# Patient Record
Sex: FEMALE | Race: WHITE | NOT HISPANIC OR LATINO | Employment: FULL TIME | URBAN - METROPOLITAN AREA
[De-identification: names, ages, dates, MRNs, and addresses within clinical notes are randomized per-mention and may not be internally consistent; named-entity substitution may affect disease eponyms.]

---

## 2024-04-22 ENCOUNTER — OFFICE VISIT (OUTPATIENT)
Age: 59
End: 2024-04-22
Payer: COMMERCIAL

## 2024-04-22 VITALS
DIASTOLIC BLOOD PRESSURE: 82 MMHG | HEART RATE: 84 BPM | HEIGHT: 67 IN | OXYGEN SATURATION: 98 % | WEIGHT: 188.4 LBS | BODY MASS INDEX: 29.57 KG/M2 | SYSTOLIC BLOOD PRESSURE: 122 MMHG

## 2024-04-22 DIAGNOSIS — E55.9 VITAMIN D DEFICIENCY: ICD-10-CM

## 2024-04-22 DIAGNOSIS — E78.2 MIXED HYPERLIPIDEMIA: ICD-10-CM

## 2024-04-22 DIAGNOSIS — R73.9 HYPERGLYCEMIA: ICD-10-CM

## 2024-04-22 DIAGNOSIS — Z79.890 POSTMENOPAUSAL HRT (HORMONE REPLACEMENT THERAPY): ICD-10-CM

## 2024-04-22 DIAGNOSIS — Z79.890 HORMONE REPLACEMENT THERAPY (HRT): ICD-10-CM

## 2024-04-22 DIAGNOSIS — F41.9 ANXIETY: ICD-10-CM

## 2024-04-22 DIAGNOSIS — N80.9 ENDOMETRIOSIS: ICD-10-CM

## 2024-04-22 DIAGNOSIS — F33.1 MODERATE EPISODE OF RECURRENT MAJOR DEPRESSIVE DISORDER: ICD-10-CM

## 2024-04-22 DIAGNOSIS — I77.810 DILATION OF THORACIC AORTA: Primary | ICD-10-CM

## 2024-04-22 DIAGNOSIS — I71.21 ANEURYSM OF ASCENDING AORTA WITHOUT RUPTURE: ICD-10-CM

## 2024-04-22 DIAGNOSIS — F32.9 REACTIVE DEPRESSION: ICD-10-CM

## 2024-04-22 DIAGNOSIS — I10 ESSENTIAL HYPERTENSION: ICD-10-CM

## 2024-04-22 PROBLEM — E78.00 ELEVATED LDL CHOLESTEROL LEVEL: Status: ACTIVE | Noted: 2024-01-26

## 2024-04-22 PROBLEM — K80.50 BILIARY COLIC: Status: ACTIVE | Noted: 2018-11-23

## 2024-04-22 PROBLEM — K80.50 BILIARY COLIC: Status: RESOLVED | Noted: 2018-11-23 | Resolved: 2024-04-22

## 2024-04-22 PROBLEM — U07.1 COVID-19: Status: ACTIVE | Noted: 2021-01-23

## 2024-04-22 PROBLEM — R79.89 ELEVATED SERUM CREATININE: Status: ACTIVE | Noted: 2020-11-01

## 2024-04-22 PROBLEM — E78.5 HYPERLIPIDEMIA: Status: ACTIVE | Noted: 2024-01-26

## 2024-04-22 PROBLEM — H93.13 TINNITUS OF BOTH EARS: Status: ACTIVE | Noted: 2024-01-26

## 2024-04-22 PROBLEM — C44.92 SQUAMOUS CELL SKIN CANCER: Status: ACTIVE | Noted: 2024-01-26

## 2024-04-22 PROBLEM — E67.2 HYPERVITAMINOSIS B6: Status: ACTIVE | Noted: 2024-01-26

## 2024-04-22 PROBLEM — M85.80 OSTEOPENIA: Status: ACTIVE | Noted: 2024-01-26

## 2024-04-22 PROBLEM — G43.909 MIGRAINE: Status: ACTIVE | Noted: 2024-04-22

## 2024-04-22 PROBLEM — N39.41 URINARY INCONTINENCE, URGE: Status: ACTIVE | Noted: 2024-01-26

## 2024-04-22 PROBLEM — K59.01 SLOW TRANSIT CONSTIPATION: Status: ACTIVE | Noted: 2024-01-26

## 2024-04-22 PROCEDURE — 99204 OFFICE O/P NEW MOD 45 MIN: CPT | Performed by: INTERNAL MEDICINE

## 2024-04-22 RX ORDER — BUPROPION HYDROCHLORIDE 150 MG/1
150 TABLET ORAL EVERY MORNING
Qty: 90 TABLET | Refills: 1 | Status: SHIPPED | OUTPATIENT
Start: 2024-04-22

## 2024-04-22 RX ORDER — ESTRADIOL 0.05 MG/D
1 PATCH TRANSDERMAL 2 TIMES WEEKLY
COMMUNITY

## 2024-04-22 RX ORDER — PROGESTERONE 100 MG/1
100 CAPSULE ORAL DAILY
COMMUNITY

## 2024-04-22 RX ORDER — MELATONIN
1 DAILY
COMMUNITY

## 2024-04-22 RX ORDER — LOSARTAN POTASSIUM 50 MG/1
50 TABLET ORAL 2 TIMES DAILY
COMMUNITY

## 2024-04-22 RX ORDER — ATORVASTATIN CALCIUM 10 MG/1
10 TABLET, FILM COATED ORAL DAILY
COMMUNITY

## 2024-04-22 RX ORDER — BUPROPION HYDROCHLORIDE 150 MG/1
150 TABLET ORAL EVERY MORNING
COMMUNITY
Start: 2024-01-26 | End: 2024-04-22 | Stop reason: SDUPTHER

## 2024-04-22 RX ORDER — LIRAGLUTIDE 6 MG/ML
INJECTION, SOLUTION SUBCUTANEOUS
COMMUNITY

## 2024-04-22 RX ORDER — VENLAFAXINE HYDROCHLORIDE 75 MG/1
75 CAPSULE, EXTENDED RELEASE ORAL DAILY
COMMUNITY
End: 2024-04-22 | Stop reason: SDUPTHER

## 2024-04-22 RX ORDER — VENLAFAXINE HYDROCHLORIDE 75 MG/1
75 CAPSULE, EXTENDED RELEASE ORAL DAILY
Qty: 90 CAPSULE | Refills: 1 | Status: SHIPPED | OUTPATIENT
Start: 2024-04-22

## 2024-04-22 RX ORDER — AMLODIPINE BESYLATE 5 MG/1
5 TABLET ORAL DAILY
COMMUNITY

## 2024-04-22 NOTE — PROGRESS NOTES
New Patient Note    Cirilo Lewis is a 58 y.o. female.    Chief Complaint   Patient presents with    Establish Care    Hypertension    Depression    Stress     Moved, changed job, .     Dizziness     Sometimes when standing up, gets dizzy       HPI    HTN  - Amlodipine 5mg and Losartan 50mg  - lost a lot of weight and after that had GB issue, thought it was a heart attack but GB  - had an ACE cough and then placed on Losartan  - previously on BID Losartan and then decreased to once daily    Dilated Aorta  - not sure which segment  - watching for 3yrs and not changed  - [facility] following annually  - needs referral    HPL  - placed on statin  - Atorvastatin    Depression  - Wellbutrin 150XL  -  was having some mental health trouble and then she needed some help as well  - also on Venlafaxine  - been in therapy  - now going through divorce  - her job was eliminated and then had to find a new job   - new house - sold house and bought house  - feels really emotional  - 5/7/24 - divorce date  - a little lonely  - moved in Nov  -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  - 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  - looking for gym    Vit D Def  - continues on vitamin d    Weight Loss  - up to 200lb  - used Liraglutide for weight loss  - down to 168 and now 188    HRT  - on progesterone and estradiol  - no children  - heavy periods  - endometriosis wrapped around one ovary and getting ready to burst  - followed with GYN  - endometriosis left on bladder, tiny spots  - completed ablation and tubal ligation  - started having severe pain  - then underwent menopause after second surgery for endometriosis  - late 40s  - Wendy Jones MD at [facility]    Past Medical History:  Patient Active Problem List   Diagnosis    Aneurysm of ascending aorta without rupture    Anxiety    Aortic ectasia, thoracic    Biliary colic    Carpal tunnel syndrome    Colon polyps    COVID-19    Elevated serum creatinine     Endometriosis    Elevated LDL cholesterol level    Essential hypertension    Hyperlipidemia    Hypervitaminosis B6    Migraine    Neuroma, Pitts's    Osteopenia    Postmenopausal HRT (hormone replacement therapy)    Reactive depression    Slow transit constipation    Squamous cell skin cancer    Tinnitus of both ears    Urinary incontinence, urge    Vitamin D deficiency        Medications:    Current Outpatient Medications:     amLODIPine (NORVASC) 5 MG tablet, Take 1 tablet by mouth Daily., Disp: , Rfl:     atorvastatin (LIPITOR) 10 MG tablet, Take 1 tablet by mouth Daily., Disp: , Rfl:     Barberry-Oreg Grape-Goldenseal (BERBERINE COMPLEX PO), Take 1 each by mouth Daily., Disp: , Rfl:     buPROPion XL (WELLBUTRIN XL) 150 MG 24 hr tablet, Take 1 tablet by mouth Every Morning., Disp: 90 tablet, Rfl: 1    cholecalciferol 25 MCG (1000 UT) tablet, Take 1 capsule by mouth Daily., Disp: , Rfl:     estradiol (CLIMARA) 0.05 MG/24HR patch, Place 1 patch on the skin as directed by provider 2 (Two) Times a Week., Disp: , Rfl:     losartan (COZAAR) 50 MG tablet, Take 1 tablet by mouth 2 (Two) Times a Day., Disp: , Rfl:     Progesterone (PROMETRIUM) 100 MG capsule, Take 1 capsule by mouth Daily., Disp: , Rfl:     venlafaxine XR (EFFEXOR-XR) 75 MG 24 hr capsule, Take 1 capsule by mouth Daily., Disp: 90 capsule, Rfl: 1    Liraglutide (Saxenda) 18 MG/3ML injection pen, Inject  under the skin into the appropriate area as directed. (Patient not taking: Reported on 4/22/2024), Disp: , Rfl:      Allergy to Medications:  Allergies   Allergen Reactions    Phentermine Anaphylaxis     Other Reaction(s): Anaphylaxis-PolymyxinB, very hyper , heart racing    Lisinopril Cough    Wound Dressing Adhesive Hives     Other Reaction(s): hives    Tape Rash     Patient developed contact dermatitis from the surgical glue used for her laparoscopic cholecystectomy        Immunizations:  Immunization History   Administered Date(s) Administered     "31-influenza Vac Quardvalent Preservativ 11/04/2016, 10/13/2020    COVID-19 (MODERNA) 1st,2nd,3rd Dose Monovalent 04/27/2021    Fluzone (or Fluarix & Flulaval for VFC) >6mos 11/03/2021, 11/17/2023    Influenza Quad Vaccine (Inpatient) 10/01/2019, 10/01/2020    Influenza Split Preservative Free ID 10/26/2017, 10/04/2018, 10/07/2019, 10/15/2020    Shingrix 03/09/2021, 05/21/2021       Surgeries:  Past Surgical History:   Procedure Laterality Date    CHOLECYSTECTOMY      HYSTERECTOMY          Family History:  Family History   Problem Relation Age of Onset    High cholesterol Mother     Heart disease Mother     Colon cancer Father     Hypertension Father     High cholesterol Father     Hypertension Brother     Heart attack Maternal Grandfather     Breast cancer Paternal Grandmother     Colon cancer Paternal Grandfather         Social:  Social History     Socioeconomic History    Marital status: Single   Tobacco Use    Smoking status: Never    Smokeless tobacco: Never   Vaping Use    Vaping status: Never Used   Substance and Sexual Activity    Alcohol use: Yes     Alcohol/week: 2.0 standard drinks of alcohol     Types: 2 Glasses of wine per week    Drug use: Never    Sexual activity: Not Currently     Partners: Male     Birth control/protection: Hysterectomy       Household:  Occupation:[employer]  Hobbies:  Exercise:      Objective   /82   Pulse 84   Ht 170 cm (66.93\")   Wt 85.5 kg (188 lb 6.4 oz)   SpO2 98%   BMI 29.57 kg/m²   BMI is >= 25 and <30. (Overweight) The following options were offered after discussion;: weight loss educational material (shared in after visit summary) and exercise counseling/recommendations       Physical Exam  Vitals reviewed.   Constitutional:       General: She is not in acute distress.  HENT:      Nose: Nose normal.      Mouth/Throat:      Mouth: Mucous membranes are moist.   Eyes:      Conjunctiva/sclera: Conjunctivae normal.   Neck:      Comments: No " thyroid enlargement or nodularity  Cardiovascular:      Rate and Rhythm: Normal rate and regular rhythm.      Heart sounds: Normal heart sounds. No murmur heard.  Pulmonary:      Effort: Pulmonary effort is normal. No respiratory distress.      Breath sounds: Normal breath sounds.   Abdominal:      General: Abdomen is flat. Bowel sounds are normal. There is no distension.      Palpations: Abdomen is soft.      Tenderness: There is no abdominal tenderness.   Musculoskeletal:      Cervical back: Neck supple.   Skin:     General: Skin is warm and dry.   Neurological:      Mental Status: She is alert.   Psychiatric:         Mood and Affect: Mood normal.         Thought Content: Thought content normal.         Judgment: Judgment normal.         Assessment & Plan   1. Thoracic Aorta Aneurysm   - present since 2020  - Followed annually previously by [facility]  - Thoracic aortic aneurysm, maximum dimension of 4.3 centimeter at the mid ascending aorta by CTA Chest April 2021. Echocardiogram May 5, 2023 showed dilated aorta with maximum dimension of 3.8 cm at the mid to distal ascending aorta.   - Ambulatory Referral to Cardiology [facility] day    2. Endometriosis  - Followed previously at [facility], re-establishing now that moved to [place]  - 2 new lesions located on bladder  - referred to [facility]  - Ambulatory Referral to Gynecology    3. Hormone replacement therapy (HRT)  - currently on Progesterone and Estrogen  - underwent hysterectomy for endometriosis prior to 51  - Ambulatory Referral to Gynecology for HRT  - discussed that as she approaches 60yo that she should discuss with GYN risks and benefits of continuation of HRT    4. Vitamin D deficiency  - continue vitamin d  - Vitamin D,25-Hydroxy; Future    5. Hyperglycemia  - Hemoglobin A1c; Future    6. Mixed hyperlipidemia  - continue Atorvastatin 10mg  - Lipid Panel; Future    7. BMI 29.0-29.9,adult  - Comprehensive Metabolic Panel; Future  - TSH+Free  T4; Future  - previously on Liraglutide but due to shortage could not find  - highest weight 200lb, lowest 168lb and now creeping up    8. Moderate episode of recurrent major depressive disorder  - buPROPion XL (WELLBUTRIN XL) 150 MG 24 hr tablet; Take 1 tablet by mouth Every Morning.  Dispense: 90 tablet; Refill: 1  - venlafaxine XR (EFFEXOR-XR) 75 MG 24 hr capsule; Take 1 capsule by mouth Daily.  Dispense: 90 capsule; Refill: 1     9. HTN  - continue Amlodipine 5mg and Losartan 50mg    FU in 1mo for annual      Health Care Maintenance:discuss next visit  HIV Screen:  Hep C Screen:    Colon Ca Screening: Start Screening at 47yo / Last Colonoscopy:  Result:  Mammogram: Start Screening at 40yo / Last mammogram:  Result:  Pap:     Immunizations:    DEXA:at 67yo    Lipids:today  A1C:today    Liana Alfonso MD, FAAP, FACP  Internal Medicine and Pediatrics  [facility]

## 2024-04-26 ENCOUNTER — TELEPHONE (OUTPATIENT)
Age: 59
End: 2024-04-26
Payer: COMMERCIAL

## 2024-04-26 NOTE — TELEPHONE ENCOUNTER
Relay    You have labs that were ordered at your last visit that are pending completion. You can stop by any [facility] lab as a walk-in Monday-Friday between 8:00-4:00 to have these labs collected.   Thank You

## 2024-04-29 ENCOUNTER — LAB (OUTPATIENT)
Age: 59
End: 2024-04-29
Payer: COMMERCIAL

## 2024-04-29 DIAGNOSIS — E78.2 MIXED HYPERLIPIDEMIA: ICD-10-CM

## 2024-04-29 DIAGNOSIS — E55.9 AVITAMINOSIS D: Primary | ICD-10-CM

## 2024-04-29 DIAGNOSIS — R73.9 BLOOD GLUCOSE ELEVATED: ICD-10-CM

## 2024-04-29 PROCEDURE — 83036 HEMOGLOBIN GLYCOSYLATED A1C: CPT | Performed by: INTERNAL MEDICINE

## 2024-04-29 PROCEDURE — 82306 VITAMIN D 25 HYDROXY: CPT | Performed by: INTERNAL MEDICINE

## 2024-04-29 PROCEDURE — 84439 ASSAY OF FREE THYROXINE: CPT | Performed by: INTERNAL MEDICINE

## 2024-04-29 PROCEDURE — 36415 COLL VENOUS BLD VENIPUNCTURE: CPT | Performed by: INTERNAL MEDICINE

## 2024-04-29 PROCEDURE — 80061 LIPID PANEL: CPT | Performed by: INTERNAL MEDICINE

## 2024-04-29 PROCEDURE — 84443 ASSAY THYROID STIM HORMONE: CPT | Performed by: INTERNAL MEDICINE

## 2024-04-29 PROCEDURE — 80053 COMPREHEN METABOLIC PANEL: CPT | Performed by: INTERNAL MEDICINE

## 2024-04-29 NOTE — TELEPHONE ENCOUNTER
Relay    You have labs that were ordered at your last visit that are pending completion. You can stop by any [facility] lab as a walk-in Monday-Friday between 8:00-4:00 to have these labs collected.   Thank You

## 2024-04-30 LAB
25(OH)D3 SERPL-MCNC: 38.1 NG/ML (ref 30–100)
ALBUMIN SERPL-MCNC: 4.5 G/DL (ref 3.5–5.2)
ALBUMIN/GLOB SERPL: 1.6 G/DL
ALP SERPL-CCNC: 103 U/L (ref 39–117)
ALT SERPL W P-5'-P-CCNC: 19 U/L (ref 1–33)
ANION GAP SERPL CALCULATED.3IONS-SCNC: 11.5 MMOL/L (ref 5–15)
AST SERPL-CCNC: 18 U/L (ref 1–32)
BILIRUB SERPL-MCNC: 0.5 MG/DL (ref 0–1.2)
BUN SERPL-MCNC: 11 MG/DL (ref 6–20)
BUN/CREAT SERPL: 10.7 (ref 7–25)
CALCIUM SPEC-SCNC: 9.4 MG/DL (ref 8.6–10.5)
CHLORIDE SERPL-SCNC: 105 MMOL/L (ref 98–107)
CHOLEST SERPL-MCNC: 169 MG/DL (ref 0–200)
CO2 SERPL-SCNC: 24.5 MMOL/L (ref 22–29)
CREAT SERPL-MCNC: 1.03 MG/DL (ref 0.57–1)
EGFRCR SERPLBLD CKD-EPI 2021: 63.6 ML/MIN/1.73
GLOBULIN UR ELPH-MCNC: 2.8 GM/DL
GLUCOSE SERPL-MCNC: 94 MG/DL (ref 65–99)
HBA1C MFR BLD: 5.9 % (ref 4.8–5.6)
HDLC SERPL-MCNC: 74 MG/DL (ref 40–60)
LDLC SERPL CALC-MCNC: 78 MG/DL (ref 0–100)
LDLC/HDLC SERPL: 1.03 {RATIO}
POTASSIUM SERPL-SCNC: 3.9 MMOL/L (ref 3.5–5.2)
PROT SERPL-MCNC: 7.3 G/DL (ref 6–8.5)
SODIUM SERPL-SCNC: 141 MMOL/L (ref 136–145)
T4 FREE SERPL-MCNC: 0.95 NG/DL (ref 0.93–1.7)
TRIGL SERPL-MCNC: 94 MG/DL (ref 0–150)
TSH SERPL DL<=0.05 MIU/L-ACNC: 2.69 UIU/ML (ref 0.27–4.2)
VLDLC SERPL-MCNC: 17 MG/DL (ref 5–40)

## 2024-05-01 NOTE — TELEPHONE ENCOUNTER
Relay    You have labs that were ordered at your last visit that are pending completion. You can stop by any [facility] lab as a walk-in Monday-Friday between 8:00-4:00 to have these labs collected.   Thank You

## 2024-05-07 ENCOUNTER — TELEPHONE (OUTPATIENT)
Age: 59
End: 2024-05-07
Payer: COMMERCIAL

## 2024-05-21 NOTE — PROGRESS NOTES
[facility]  [address], [address]  [place], [place], [address]    [phone]  [email or web address] [email or web address]           OUTPATIENT CLINIC CONSULTATION NOTE    Patient care team:  Patient Care Team:  Liana Alfonso MD as PCP - General (Internal Medicine)  Derek Gomez MD as Consulting Physician (Cardiology)    Requesting Provider and Reason for consultation: The patient is being seen today at the request of Liana Alfonso MD for dilation of thoracic aorta.     Subjective:   Chief complaint:   Chief Complaint   Patient presents with    [facility] Care     New Patient         Debbie Morris is a 58 y.o. female.  Cardiac focused problem list:  Dilation of thoracic aorta   Echocardiogram 11/25/2019:  LVEF 64 +/- 5%. Visualized aorta is borderline dilated with maximal dimension of 3.9 cm. No significant valvular abnormalities.   Echocardiogram 11/03/2020:  LVEF 61 +/- 5%. RV systolic function is normal.  Visualized aorta is borderline dilated with maximal dimension of 3.9 cm. No significant valvular abnormalities.   Echocardiogram 5/05/2023:  LVEF 57 +/- 5%. No significant valvular abnormalities. Visualized aorta is borderline dilated with maximal dimension of 3.8 cm at the mid-distal ascending aorta.   Hyperlipidemia   Hypertension   Migraines   Pitts's neuroma   Squamous cell skin cancer   Endometriosis     HPI:    Patient presents today in consultation for dilation of thoracic aorta.     Active without cardiopulmonary symptoms.  Occasional lightheadedness.  1 episode of recent vertigo.  Blood pressure not regularly checked at home.    Non-smoker.  Mother has stents.    Review of Systems:  As noted above in the HPI    PFSH:  Patient Active Problem List   Diagnosis    Ascending aortic aneurysm    Anxiety    Aortic ectasia, thoracic    Carpal tunnel syndrome    Colon polyps    COVID-19    Elevated serum creatinine    Endometriosis    Elevated LDL cholesterol level    Essential  hypertension    Hyperlipidemia    Hypervitaminosis B6    Migraine    Neuroma, Pitts's    Osteopenia    Postmenopausal HRT (hormone replacement therapy)    Reactive depression    Slow transit constipation    Squamous cell skin cancer    Tinnitus of both ears    Urinary incontinence, urge    Vitamin D deficiency         Current Outpatient Medications:     amLODIPine (NORVASC) 5 MG tablet, Take 1 tablet by mouth Daily., Disp: , Rfl:     atorvastatin (LIPITOR) 10 MG tablet, Take 1 tablet by mouth Daily., Disp: , Rfl:     Barberry-Oreg Grape-Goldenseal (BERBERINE COMPLEX PO), Take 1 each by mouth Daily., Disp: , Rfl:     buPROPion XL (WELLBUTRIN XL) 150 MG 24 hr tablet, Take 1 tablet by mouth Every Morning., Disp: 90 tablet, Rfl: 1    cholecalciferol 25 MCG (1000 UT) tablet, Take 1 capsule by mouth Daily., Disp: , Rfl:     estradiol (CLIMARA) 0.05 MG/24HR patch, Place 1 patch on the skin as directed by provider 2 (Two) Times a Week., Disp: , Rfl:     losartan (COZAAR) 50 MG tablet, Take 1 tablet by mouth 2 (Two) Times a Day., Disp: , Rfl:     Progesterone (PROMETRIUM) 100 MG capsule, Take 1 capsule by mouth Daily., Disp: , Rfl:     venlafaxine XR (EFFEXOR-XR) 75 MG 24 hr capsule, Take 1 capsule by mouth Daily., Disp: 90 capsule, Rfl: 1    Liraglutide (Saxenda) 18 MG/3ML injection pen, Inject  under the skin into the appropriate area as directed. (Patient not taking: Reported on 4/22/2024), Disp: , Rfl:     Allergies   Allergen Reactions    Phentermine Anaphylaxis     Other Reaction(s): Anaphylaxis-PolymyxinB, very hyper , heart racing    Lisinopril Cough    Wound Dressing Adhesive Hives     Other Reaction(s): hives    Tape Rash     Patient developed contact dermatitis from the surgical glue used for her laparoscopic cholecystectomy       Social History     Socioeconomic History    Marital status: Single   Tobacco Use    Smoking status: Never    Smokeless tobacco: Never   Vaping Use    Vaping status: Never Used  "  Substance and Sexual Activity    Alcohol use: Yes     Alcohol/week: 2.0 standard drinks of alcohol     Types: 2 Glasses of wine per week    Drug use: Never    Sexual activity: Not Currently     Partners: Male     Birth control/protection: Hysterectomy     Family History   Problem Relation Age of Onset    High cholesterol Mother     Heart disease Mother     Colon cancer Father     Hypertension Father     High cholesterol Father     Hypertension Brother     Heart attack Maternal Grandfather     Breast cancer Paternal Grandmother     Colon cancer Paternal Grandfather          Objective:   Physical Exam:  /82 (BP Location: Right arm, Patient Position: Sitting)   Pulse 87   Ht 172.7 cm (68\")   Wt 85.3 kg (188 lb)   SpO2 98%   BMI 28.59 kg/m²   CONSTITUTIONAL: No acute distress  CARDIOVASCULAR: Regular rate and rhythm with normal S1 and S2. Without murmur.  PERIPHERAL VASCULAR: No carotid bruit bilaterally.  Normal radial pulse.     Labs:  Labs reviewed by myself    Lab Results   Component Value Date    CHOL 169 04/29/2024     Lab Results   Component Value Date    TRIG 94 04/29/2024     Lab Results   Component Value Date    HDL 74 (H) 04/29/2024     Lab Results   Component Value Date    LDL 78 04/29/2024     No components found for: \"LDLDIRECTC\"    Diagnostic Data:      ECG 12 Lead    Date/Time: 5/29/2024 1:46 PM  Performed by: Derek Gomez MD    Authorized by: Derek Gomez MD  Previous ECG: no previous ECG available  Rhythm: sinus rhythm  Comments: Nonspecific T wave abnormality              Assessment and Plan:     Ascending aortic aneurysm, unspecified whether ruptured  Essential hypertension  Mixed hyperlipidemia   -CTA of the chest  -Continue current medications  -Monitor blood pressure at home.  If averaging below 110 mmHg systolic consistently, may explain ongoing lightheadedness  -Encouraged exercise, heart healthy diet, quality sleep    - Return in about 1 year (around 5/29/2025) for Next " scheduled follow-up with an ECG.

## 2024-05-29 ENCOUNTER — OFFICE VISIT (OUTPATIENT)
Dept: CARDIOLOGY | Facility: CLINIC | Age: 59
End: 2024-05-29
Payer: COMMERCIAL

## 2024-05-29 VITALS
DIASTOLIC BLOOD PRESSURE: 82 MMHG | WEIGHT: 188 LBS | SYSTOLIC BLOOD PRESSURE: 132 MMHG | BODY MASS INDEX: 28.49 KG/M2 | OXYGEN SATURATION: 98 % | HEIGHT: 68 IN | HEART RATE: 87 BPM

## 2024-05-29 DIAGNOSIS — I71.21 ASCENDING AORTIC ANEURYSM, UNSPECIFIED WHETHER RUPTURED: Primary | ICD-10-CM

## 2024-05-29 DIAGNOSIS — E78.2 MIXED HYPERLIPIDEMIA: ICD-10-CM

## 2024-05-29 DIAGNOSIS — I10 ESSENTIAL HYPERTENSION: ICD-10-CM

## 2024-05-30 ENCOUNTER — OFFICE VISIT (OUTPATIENT)
Age: 59
End: 2024-05-30
Payer: COMMERCIAL

## 2024-05-30 VITALS
BODY MASS INDEX: 28.34 KG/M2 | DIASTOLIC BLOOD PRESSURE: 76 MMHG | SYSTOLIC BLOOD PRESSURE: 114 MMHG | WEIGHT: 187 LBS | HEART RATE: 88 BPM | OXYGEN SATURATION: 98 % | HEIGHT: 68 IN

## 2024-05-30 DIAGNOSIS — Z00.00 WELLNESS EXAMINATION: Primary | ICD-10-CM

## 2024-05-30 DIAGNOSIS — Z12.83 SKIN EXAM, SCREENING FOR CANCER: ICD-10-CM

## 2024-05-30 DIAGNOSIS — M85.80 OSTEOPENIA, UNSPECIFIED LOCATION: ICD-10-CM

## 2024-05-30 PROCEDURE — 99396 PREV VISIT EST AGE 40-64: CPT | Performed by: INTERNAL MEDICINE

## 2024-05-30 NOTE — PROGRESS NOTES
Annual Health Maintenance Exam    Subjective      Debbie SWIFT     Ms. Morris is here today for their annual visit.     General Health:  Reported Health: Good    Social History:  Household Members: Patient  Marital Status:   Housing Situation: Stable  Work Status: Employed Full Time  Occupation: [employer]  Hobbies/ Travel:    General Health:  Diet: less carbs  Caffeine:no  Calcium Intake:breakfast cereal, yogurt, cheese,   Weight Concerns:yes  Supplements:vit d  Exercise:walking  Screen Time:    Dental Exam:last year  Dental Concerns:no, getting set up since moved    Vision Exam:q2 years  Vision Concerns:wears glasses    Hearing Loss:no    Risky Behaviors:  Seatbelt Use:yes  Texting While Driving:no    Tobacco Use:no  If positive:     pack years    Alcohol Use:occasional  If positive, consider AUDITC    Drug Use:no  If positive, consider DAST    Reproductive Health:  LMP:Hysterectomy, cervix remains, endometriosis  Last Pap: Date:    Cytology / HPV Result:appt for pap scheduled  History of Abnormal Pap:in 20s, none since that time  On HRT    Mood:  PHQ-9 Depression Screening  Little interest or pleasure in doing things?     Feeling down, depressed, or hopeless?     Trouble falling or staying asleep, or sleeping too much?     Feeling tired or having little energy?     Poor appetite or overeating?     Feeling bad about yourself - or that you are a failure or have let yourself or your family down?     Trouble concentrating on things, such as reading the newspaper or watching television?     Moving or speaking so slowly that other people could have noticed? Or the opposite - being so fidgety or restless that you have been moving around a lot more than usual?     Thoughts that you would be better off dead, or of hurting yourself in some way?     PHQ-9 Total Score     If you checked off any problems, how difficult have these problems made it for you to do your work, take care of  things at home, or get along with other people?        PHQ-9 Total Score:   mood doing ok    Anxiety Screening: GAD7 Score    Past Medical History:  Patient Active Problem List   Diagnosis    Ascending aortic aneurysm    Anxiety    Aortic ectasia, thoracic    Carpal tunnel syndrome    Colon polyps    COVID-19    Elevated serum creatinine    Endometriosis    Elevated LDL cholesterol level    Essential hypertension    Hyperlipidemia    Hypervitaminosis B6    Migraine    Neuroma, Pitts's    Osteopenia    Postmenopausal HRT (hormone replacement therapy)    Reactive depression    Slow transit constipation    Squamous cell skin cancer    Tinnitus of both ears    Urinary incontinence, urge    Vitamin D deficiency        Allergies:  Allergies   Allergen Reactions    Phentermine Anaphylaxis     Other Reaction(s): Anaphylaxis-PolymyxinB, very hyper , heart racing    Lisinopril Cough    Wound Dressing Adhesive Hives     Other Reaction(s): hives    Tape Rash     Patient developed contact dermatitis from the surgical glue used for her laparoscopic cholecystectomy        Medications:    Current Outpatient Medications:     amLODIPine (NORVASC) 5 MG tablet, Take 1 tablet by mouth Daily., Disp: , Rfl:     atorvastatin (LIPITOR) 10 MG tablet, Take 1 tablet by mouth Daily., Disp: , Rfl:     Barberry-Oreg Grape-Goldenseal (BERBERINE COMPLEX PO), Take 1 each by mouth Daily., Disp: , Rfl:     buPROPion XL (WELLBUTRIN XL) 150 MG 24 hr tablet, Take 1 tablet by mouth Every Morning., Disp: 90 tablet, Rfl: 1    cholecalciferol 25 MCG (1000 UT) tablet, Take 1 capsule by mouth Daily., Disp: , Rfl:     estradiol (CLIMARA) 0.05 MG/24HR patch, Place 1 patch on the skin as directed by provider 2 (Two) Times a Week., Disp: , Rfl:     losartan (COZAAR) 50 MG tablet, Take 1 tablet by mouth 2 (Two) Times a Day., Disp: , Rfl:     Progesterone (PROMETRIUM) 100 MG capsule, Take 1 capsule by mouth Daily., Disp: , Rfl:     venlafaxine XR (EFFEXOR-XR) 75 MG  24 hr capsule, Take 1 capsule by mouth Daily., Disp: 90 capsule, Rfl: 1    Liraglutide (Saxenda) 18 MG/3ML injection pen, Inject  under the skin into the appropriate area as directed. (Patient not taking: Reported on 4/22/2024), Disp: , Rfl:      Immunizations:  Immunization History   Administered Date(s) Administered    31-influenza Vac Quardvalent Preservativ 11/04/2016, 10/13/2020    COVID-19 (MODERNA) 1st,2nd,3rd Dose Monovalent 04/27/2021    Fluzone (or Fluarix & Flulaval for VFC) >6mos 11/03/2021, 11/17/2023    Influenza Quad Vaccine (Inpatient) 10/01/2019, 10/01/2020    Influenza Split Preservative Free ID 10/26/2017, 10/04/2018, 10/07/2019, 10/15/2020    Shingrix 03/09/2021, 05/21/2021    Tdap 04/27/2023        Surgical History:  Past Surgical History:   Procedure Laterality Date    CHOLECYSTECTOMY      HYSTERECTOMY          Family History:  Family History   Problem Relation Age of Onset    High cholesterol Mother     Heart disease Mother     Colon cancer Father     Hypertension Father     High cholesterol Father     Hypertension Brother     Heart attack Maternal Grandfather     Breast cancer Paternal Grandmother     Colon cancer Paternal Grandfather      Any change in family history since last annual visit: no  Any family history of colon cancer, breast cancer, ovarian cancer, early CAD: see fam history      The following portions of the patient's chart were reviewed in this encounter and updated as appropriate: Past Medical History, Surgical History, Family History, and Social History         Objective      Vitals:    05/30/24 1356   BP: 114/76   Pulse: 88   SpO2: 98%      Physical Exam  Vitals reviewed.   Constitutional:       General: She is not in acute distress.  HENT:      Mouth/Throat:      Mouth: Mucous membranes are moist.   Eyes:      Conjunctiva/sclera: Conjunctivae normal.   Cardiovascular:      Rate and Rhythm: Normal rate and regular rhythm.      Heart sounds: Normal heart sounds. No murmur  heard.  Pulmonary:      Effort: Pulmonary effort is normal. No respiratory distress.      Breath sounds: Normal breath sounds.   Abdominal:      General: Abdomen is flat. Bowel sounds are normal. There is no distension.      Palpations: Abdomen is soft.      Tenderness: There is no abdominal tenderness.      Hernia: No hernia is present.   Skin:     General: Skin is warm and dry.   Neurological:      Mental Status: She is alert.   Psychiatric:         Mood and Affect: Mood normal.         Thought Content: Thought content normal.         Judgment: Judgment normal.     }     Assessment & Plan        Today was a preventative health visit: Patient was counseled on the following:  Lifestyle Changes: Weight Loss, Diet, Exercise  Calcium and Vitamin D Supplementation and Weight Bearing Exercise for Bone Health  Reproductive Health, contraception, safe sex, healthy relationships  Safety with driving  Work and Life Balance    Health Maintenance:    Infectious Disease Screening:  One Time HIV Screen: will order with next labs  One Time Hepatitis C Screen: will order with next labs    Vaccinations:  Tdap:  Hep A:  Hep B:  Shingles:  PPSV:  PCV:  COVID:  Flu:     Cancer Screening:  Colonoscopy:  Last date completed and Findings:11/10/2020, h/o polyps personally, last scope normal per report, Fam history of colon cancer in Dad and Pat GF    Next Due: 11/10/2025  Mammogram: currently scheduled and pending  Pap: scheduled with GYN, appt pending  Lung Cancer Screening: N/A nonsmoker     CV Screening:  Lipids: Last date completed: 4/2024  Next Due: 4/2025  A1C: Last date completed: 4/2024  Next Due: 4/2025    BP at goal < 130/80    Mood: mood is doing ok, feels like building some community, time is helping in processing divorce, move etc    Recommended:Dental Visit / Eye Exam    Bone Health: Recommended DEXA, ordered today, and appropriate vitamin D and Calcium intake    Referred to Derm for annual exam, history of melanoma in  situ    FU in 6mo for chronic conditions        Liana Alfonso MD, FAAP, FACP  Internal Medicine and Pediatrics  [facility]

## 2024-06-07 ENCOUNTER — HOSPITAL ENCOUNTER (OUTPATIENT)
Dept: CT IMAGING | Facility: HOSPITAL | Age: 59
Discharge: HOME | End: 2024-06-07
Admitting: INTERNAL MEDICINE
Payer: COMMERCIAL

## 2024-06-07 DIAGNOSIS — J98.59 MEDIASTINAL MASS: Primary | ICD-10-CM

## 2024-06-07 PROCEDURE — 25510000001 IOPAMIDOL PER 1 ML: Performed by: INTERNAL MEDICINE

## 2024-06-07 PROCEDURE — 71275 CT ANGIOGRAPHY CHEST: CPT

## 2024-06-07 RX ADMIN — IOPAMIDOL 75 ML: 755 INJECTION, SOLUTION INTRAVENOUS at 14:54

## 2024-06-07 NOTE — PROGRESS NOTES
Brief cardiology update note  -Called patient with results of her CT scan  -Mildly dilated thoracic aorta is unchanged  -Discussed incidentally noted mediastinal mass and lung nodules/adenopathy  -Discussed findings also with Dr. Brooks, pulmonology  -Patient referred to lung nodule clinic.  May warrant biopsy/further evaluation even prior to being seen in the lung nodule clinic if Dr. Brooks deems it warranted.  Message sent to him with further details.  Patient to expect a call early next week to set up next steps

## 2024-06-10 ENCOUNTER — PRE-ADMISSION TESTING (OUTPATIENT)
Dept: PREADMISSION TESTING | Facility: HOSPITAL | Age: 59
End: 2024-06-10
Payer: COMMERCIAL

## 2024-06-10 ENCOUNTER — PREP FOR SURGERY (OUTPATIENT)
Dept: OTHER | Facility: HOSPITAL | Age: 59
End: 2024-06-10
Payer: COMMERCIAL

## 2024-06-10 VITALS — WEIGHT: 189.38 LBS | HEIGHT: 69 IN | BODY MASS INDEX: 28.05 KG/M2

## 2024-06-10 DIAGNOSIS — J98.59 MEDIASTINAL MASS: ICD-10-CM

## 2024-06-10 DIAGNOSIS — R91.8 HILAR MASS: ICD-10-CM

## 2024-06-10 DIAGNOSIS — J98.59 MEDIASTINAL MASS: Primary | ICD-10-CM

## 2024-06-10 LAB
ALBUMIN SERPL-MCNC: 4.2 G/DL (ref 3.5–5.2)
ALBUMIN/GLOB SERPL: 1.4 G/DL
ALP SERPL-CCNC: 116 U/L (ref 39–117)
ALT SERPL W P-5'-P-CCNC: 14 U/L (ref 1–33)
ANION GAP SERPL CALCULATED.3IONS-SCNC: 9 MMOL/L (ref 5–15)
APTT PPP: 28.2 SECONDS (ref 22–39)
AST SERPL-CCNC: 17 U/L (ref 1–32)
BASOPHILS # BLD AUTO: 0.04 10*3/MM3 (ref 0–0.2)
BASOPHILS NFR BLD AUTO: 0.6 % (ref 0–1.5)
BILIRUB SERPL-MCNC: 0.4 MG/DL (ref 0–1.2)
BUN SERPL-MCNC: 11 MG/DL (ref 6–20)
BUN/CREAT SERPL: 12.1 (ref 7–25)
CALCIUM SPEC-SCNC: 9 MG/DL (ref 8.6–10.5)
CHLORIDE SERPL-SCNC: 107 MMOL/L (ref 98–107)
CO2 SERPL-SCNC: 26 MMOL/L (ref 22–29)
CREAT SERPL-MCNC: 0.91 MG/DL (ref 0.57–1)
DEPRECATED RDW RBC AUTO: 40.6 FL (ref 37–54)
EGFRCR SERPLBLD CKD-EPI 2021: 73.7 ML/MIN/1.73
EOSINOPHIL # BLD AUTO: 0.02 10*3/MM3 (ref 0–0.4)
EOSINOPHIL NFR BLD AUTO: 0.3 % (ref 0.3–6.2)
ERYTHROCYTE [DISTWIDTH] IN BLOOD BY AUTOMATED COUNT: 11.9 % (ref 12.3–15.4)
GLOBULIN UR ELPH-MCNC: 3.1 GM/DL
GLUCOSE SERPL-MCNC: 96 MG/DL (ref 65–99)
HCT VFR BLD AUTO: 44.3 % (ref 34–46.6)
HGB BLD-MCNC: 14.5 G/DL (ref 12–15.9)
IMM GRANULOCYTES # BLD AUTO: 0.02 10*3/MM3 (ref 0–0.05)
IMM GRANULOCYTES NFR BLD AUTO: 0.3 % (ref 0–0.5)
INR PPP: 0.97 (ref 0.89–1.12)
LYMPHOCYTES # BLD AUTO: 1.83 10*3/MM3 (ref 0.7–3.1)
LYMPHOCYTES NFR BLD AUTO: 28.8 % (ref 19.6–45.3)
MCH RBC QN AUTO: 30.1 PG (ref 26.6–33)
MCHC RBC AUTO-ENTMCNC: 32.7 G/DL (ref 31.5–35.7)
MCV RBC AUTO: 92.1 FL (ref 79–97)
MONOCYTES # BLD AUTO: 0.52 10*3/MM3 (ref 0.1–0.9)
MONOCYTES NFR BLD AUTO: 8.2 % (ref 5–12)
NEUTROPHILS NFR BLD AUTO: 3.93 10*3/MM3 (ref 1.7–7)
NEUTROPHILS NFR BLD AUTO: 61.8 % (ref 42.7–76)
NRBC BLD AUTO-RTO: 0 /100 WBC (ref 0–0.2)
PLATELET # BLD AUTO: 316 10*3/MM3 (ref 140–450)
PMV BLD AUTO: 9.4 FL (ref 6–12)
POTASSIUM SERPL-SCNC: 4.1 MMOL/L (ref 3.5–5.2)
PROT SERPL-MCNC: 7.3 G/DL (ref 6–8.5)
PROTHROMBIN TIME: 12.9 SECONDS (ref 12.2–14.5)
RBC # BLD AUTO: 4.81 10*6/MM3 (ref 3.77–5.28)
SODIUM SERPL-SCNC: 142 MMOL/L (ref 136–145)
WBC NRBC COR # BLD AUTO: 6.36 10*3/MM3 (ref 3.4–10.8)

## 2024-06-10 PROCEDURE — 85025 COMPLETE CBC W/AUTO DIFF WBC: CPT

## 2024-06-10 PROCEDURE — 36415 COLL VENOUS BLD VENIPUNCTURE: CPT

## 2024-06-10 PROCEDURE — 85730 THROMBOPLASTIN TIME PARTIAL: CPT

## 2024-06-10 PROCEDURE — 80053 COMPREHEN METABOLIC PANEL: CPT

## 2024-06-10 PROCEDURE — 85610 PROTHROMBIN TIME: CPT

## 2024-06-10 RX ORDER — LIDOCAINE HYDROCHLORIDE 40 MG/ML
4 INJECTION, SOLUTION RETROBULBAR; TOPICAL ONCE
Status: CANCELLED | OUTPATIENT
Start: 2024-06-10 | End: 2024-06-10

## 2024-06-10 NOTE — PAT
EKG on chart 5/29/24    Per Anesthesia Request, patient instructed not to take their ACE/ARB medications on the AM of surgery.

## 2024-06-11 ENCOUNTER — HOSPITAL ENCOUNTER (OUTPATIENT)
Facility: HOSPITAL | Age: 59
Setting detail: HOSPITAL OUTPATIENT SURGERY
Discharge: HOME | End: 2024-06-11
Attending: INTERNAL MEDICINE | Admitting: INTERNAL MEDICINE
Payer: COMMERCIAL

## 2024-06-11 ENCOUNTER — ANESTHESIA EVENT (OUTPATIENT)
Dept: GASTROENTEROLOGY | Facility: HOSPITAL | Age: 59
End: 2024-06-11
Payer: COMMERCIAL

## 2024-06-11 ENCOUNTER — ANESTHESIA (OUTPATIENT)
Dept: GASTROENTEROLOGY | Facility: HOSPITAL | Age: 59
End: 2024-06-11
Payer: COMMERCIAL

## 2024-06-11 VITALS
HEART RATE: 80 BPM | SYSTOLIC BLOOD PRESSURE: 108 MMHG | DIASTOLIC BLOOD PRESSURE: 84 MMHG | OXYGEN SATURATION: 95 % | RESPIRATION RATE: 20 BRPM | TEMPERATURE: 97 F

## 2024-06-11 DIAGNOSIS — R91.8 HILAR MASS: ICD-10-CM

## 2024-06-11 DIAGNOSIS — J98.59 MEDIASTINAL MASS: ICD-10-CM

## 2024-06-11 PROCEDURE — 25010000002 ESMOLOL 100 MG/10ML SOLUTION: Performed by: NURSE ANESTHETIST, CERTIFIED REGISTERED

## 2024-06-11 PROCEDURE — 25010000002 ONDANSETRON PER 1 MG: Performed by: NURSE ANESTHETIST, CERTIFIED REGISTERED

## 2024-06-11 PROCEDURE — 25010000002 DEXAMETHASONE PER 1 MG: Performed by: NURSE ANESTHETIST, CERTIFIED REGISTERED

## 2024-06-11 PROCEDURE — C1726 CATH, BAL DIL, NON-VASCULAR: HCPCS | Performed by: INTERNAL MEDICINE

## 2024-06-11 PROCEDURE — 25010000002 PROPOFOL 10 MG/ML EMULSION: Performed by: NURSE ANESTHETIST, CERTIFIED REGISTERED

## 2024-06-11 PROCEDURE — 25010000002 SUGAMMADEX 200 MG/2ML SOLUTION: Performed by: NURSE ANESTHETIST, CERTIFIED REGISTERED

## 2024-06-11 PROCEDURE — 99204 OFFICE O/P NEW MOD 45 MIN: CPT | Performed by: INTERNAL MEDICINE

## 2024-06-11 PROCEDURE — 25810000003 LACTATED RINGERS PER 1000 ML: Performed by: NURSE ANESTHETIST, CERTIFIED REGISTERED

## 2024-06-11 PROCEDURE — 31653 BRONCH EBUS SAMPLNG 3/> NODE: CPT | Performed by: INTERNAL MEDICINE

## 2024-06-11 PROCEDURE — 31624 DX BRONCHOSCOPE/LAVAGE: CPT | Performed by: INTERNAL MEDICINE

## 2024-06-11 RX ORDER — ONDANSETRON 2 MG/ML
INJECTION INTRAMUSCULAR; INTRAVENOUS AS NEEDED
Status: DISCONTINUED | OUTPATIENT
Start: 2024-06-11 | End: 2024-06-11 | Stop reason: SURG

## 2024-06-11 RX ORDER — ESMOLOL HYDROCHLORIDE 10 MG/ML
INJECTION INTRAVENOUS AS NEEDED
Status: DISCONTINUED | OUTPATIENT
Start: 2024-06-11 | End: 2024-06-11 | Stop reason: SURG

## 2024-06-11 RX ORDER — PROPOFOL 10 MG/ML
VIAL (ML) INTRAVENOUS AS NEEDED
Status: DISCONTINUED | OUTPATIENT
Start: 2024-06-11 | End: 2024-06-11 | Stop reason: SURG

## 2024-06-11 RX ORDER — SODIUM CHLORIDE, SODIUM LACTATE, POTASSIUM CHLORIDE, CALCIUM CHLORIDE 600; 310; 30; 20 MG/100ML; MG/100ML; MG/100ML; MG/100ML
INJECTION, SOLUTION INTRAVENOUS CONTINUOUS PRN
Status: DISCONTINUED | OUTPATIENT
Start: 2024-06-11 | End: 2024-06-11 | Stop reason: SURG

## 2024-06-11 RX ORDER — DROPERIDOL 2.5 MG/ML
0.62 INJECTION, SOLUTION INTRAMUSCULAR; INTRAVENOUS ONCE AS NEEDED
Status: DISCONTINUED | OUTPATIENT
Start: 2024-06-11 | End: 2024-06-11 | Stop reason: HOSPADM

## 2024-06-11 RX ORDER — ROCURONIUM BROMIDE 10 MG/ML
INJECTION, SOLUTION INTRAVENOUS AS NEEDED
Status: DISCONTINUED | OUTPATIENT
Start: 2024-06-11 | End: 2024-06-11 | Stop reason: SURG

## 2024-06-11 RX ORDER — DEXAMETHASONE SODIUM PHOSPHATE 4 MG/ML
INJECTION, SOLUTION INTRA-ARTICULAR; INTRALESIONAL; INTRAMUSCULAR; INTRAVENOUS; SOFT TISSUE AS NEEDED
Status: DISCONTINUED | OUTPATIENT
Start: 2024-06-11 | End: 2024-06-11 | Stop reason: SURG

## 2024-06-11 RX ADMIN — ESMOLOL HYDROCHLORIDE 20 MG: 100 INJECTION, SOLUTION INTRAVENOUS at 15:11

## 2024-06-11 RX ADMIN — SODIUM CHLORIDE, POTASSIUM CHLORIDE, SODIUM LACTATE AND CALCIUM CHLORIDE: 600; 310; 30; 20 INJECTION, SOLUTION INTRAVENOUS at 15:07

## 2024-06-11 RX ADMIN — ONDANSETRON 4 MG: 2 INJECTION INTRAMUSCULAR; INTRAVENOUS at 16:11

## 2024-06-11 RX ADMIN — ROCURONIUM BROMIDE 50 MG: 10 INJECTION INTRAVENOUS at 15:11

## 2024-06-11 RX ADMIN — PROPOFOL 200 MG: 10 INJECTION, EMULSION INTRAVENOUS at 15:11

## 2024-06-11 RX ADMIN — DEXAMETHASONE SODIUM PHOSPHATE 4 MG: 4 INJECTION INTRA-ARTICULAR; INTRALESIONAL; INTRAMUSCULAR; INTRAVENOUS; SOFT TISSUE at 15:20

## 2024-06-11 RX ADMIN — SUGAMMADEX 200 MG: 100 INJECTION, SOLUTION INTRAVENOUS at 16:11

## 2024-06-11 NOTE — H&P
[facility]   HISTORY AND PHYSICAL    Patient Name:Debbie Morris  : 1965  MRN: [id number]  Primary Care Physician: Linaa Alfonso MD  Date of admission: 2024    Subjective   Subjective     Chief Complaint: Abnormal CT Chest    History of Present Illness     Debbie Morris is a 58 y.o. female with a history of melanoma who was referred to Dr. Gomez for an ascending aortic aneurysm. A CTA of the chest was ordered and showed a large right hilar and mediastinal mass encasing the pulmonary arteries and bronchi and narrowing the lobar pulmonary arteries and bronchi concerning for a primary malignancy. She is referred for bronchoscopy with EBUS for further evaluation.     Review of Systems   All other systems reviewed and are negative.        Personal History     Past Medical History:   Diagnosis Date    Anxiety     Biliary colic 2018    Formatting of this note might be different from the original.   Added automatically from request for surgery [id number]      Cancer     melanoma    Colon polyps     Depression     Dizzy     Endometriosis     Enlarged aorta     Enlarged aorta     Hyperlipidemia     Hypertension     Menopause     Migraine     once year    Surgical menopause     Tinnitus     Vertigo     Wears glasses        Past Surgical History:   Procedure Laterality Date    CHOLECYSTECTOMY      COLONOSCOPY      HYSTERECTOMY      partial - kept cervix    WISDOM TOOTH EXTRACTION         Family History: Her family history includes Breast cancer in her paternal grandmother; Colon cancer in her father and paternal grandfather; Heart attack in her maternal grandfather; Heart disease in her mother; High cholesterol in her father and mother; Hypertension in her brother and father.     Social History: She  reports that she has never smoked. She has never used smokeless tobacco. She reports current alcohol use of about 2.0 standard drinks of alcohol per week. She reports that she does not use  drugs.    Home Medications:  Barberry-Oreg Grape-Goldenseal, Progesterone, amLODIPine, atorvastatin, buPROPion XL, cholecalciferol, estradiol, losartan, and venlafaxine XR    Allergies:  She is allergic to phentermine, lisinopril, wound dressing adhesive, and tape.    Objective    Objective     Vitals:    Temp:  [97.2 °F (36.2 °C)] 97.2 °F (36.2 °C)  Heart Rate:  [85] 85  Resp:  [18] 18  BP: (130)/(83) 130/83    Physical Exam  Vitals and nursing note reviewed.   Constitutional:       General: She is not in acute distress.     Appearance: She is well-developed.   HENT:      Head: Normocephalic and atraumatic.   Eyes:      General: No scleral icterus.     Conjunctiva/sclera: Conjunctivae normal.      Pupils: Pupils are equal, round, and reactive to light.   Neck:      Thyroid: No thyromegaly.      Trachea: No tracheal deviation.   Cardiovascular:      Rate and Rhythm: Normal rate and regular rhythm.      Heart sounds: Normal heart sounds.   Pulmonary:      Effort: Pulmonary effort is normal. No respiratory distress.      Breath sounds: Normal breath sounds.   Abdominal:      General: Bowel sounds are normal.      Palpations: Abdomen is soft.      Tenderness: There is no abdominal tenderness.   Musculoskeletal:         General: Normal range of motion.      Cervical back: Normal range of motion and neck supple.   Lymphadenopathy:      Cervical: No cervical adenopathy.   Skin:     General: Skin is warm and dry.      Findings: No erythema or rash.   Neurological:      Mental Status: She is alert and oriented to person, place, and time.      Motor: No abnormal muscle tone.      Coordination: Coordination normal.   Psychiatric:         Speech: Speech normal.         Behavior: Behavior normal.         Judgment: Judgment normal.          Result Review    Result Review:  I have personally reviewed the results from the time of this admission to 6/11/2024 14:26 EDT and agree with these findings:  [x]  Laboratory list /  accordion  []  Microbiology  [x]  Radiology  []  EKG/Telemetry   []  Cardiology/Vascular   []  Pathology  []  Old records  []  Other:    Most notable findings include: CTA chest with considerable mediastinal and hilar adenopathy.       Assessment & Plan   Assessment / Plan     Brief Patient Summary:  Debbie Morris is a 58 y.o. female with a history of melanoma who was referred to Dr. Gomez for an ascending aortic aneurysm. A CTA of the chest was ordered and showed a large right hilar and mediastinal mass encasing the pulmonary arteries and bronchi and narrowing the lobar pulmonary arteries and bronchi concerning for a primary malignancy. She is referred for bronchoscopy with EBUS for further evaluation.     Active Hospital Problems:  Active Hospital Problems    Diagnosis     Mediastinal mass     Hilar mass      Plan:   I have discussed the plan with Ms. Morris for bronchoscopy with EBUS for further evaluation. She understands the risk of the procedure including but not limited to bleeding, pneumothorax, and prolonged mechanical ventilation and is willing to proceed.     VTE Prophylaxis:  No VTE prophylaxis order currently exists.        Veronica V Case, DO

## 2024-06-11 NOTE — ANESTHESIA PROCEDURE NOTES
Airway  Urgency: elective    Date/Time: 6/11/2024 3:20 PM  Airway not difficult    General Information and Staff    Patient location during procedure: OR    Indications and Patient Condition  Indications for airway management: airway protection    Preoxygenated: yes  MILS not maintained throughout  Mask difficulty assessment: 1 - vent by mask    Final Airway Details  Final airway type: endotracheal airway      Successful airway: ETT  Cuffed: yes   Successful intubation technique: video laryngoscopy  Endotracheal tube insertion site: oral  Blade: Harris  Blade size: 3  ETT size (mm): 8.5  Cormack-Lehane Classification: grade I - full view of glottis  Placement verified by: chest auscultation and capnometry   Measured from: lips  ETT/EBT  to lips (cm): 20  Number of attempts at approach: 1  Assessment: lips, teeth, and gum same as pre-op and atraumatic intubation    Additional Comments  Negative epigastric sounds, Breath sound equal bilaterally with symmetric chest rise and fall

## 2024-06-11 NOTE — ANESTHESIA POSTPROCEDURE EVALUATION
Patient: Debbie Morris    Procedure Summary       Date: 06/11/24 Room / Location:  [facility]    Anesthesia Start: 1507 Anesthesia Stop: 1615    Procedure: BRONCHOSCOPY WITH ENDOBRONCHIAL ULTRASOUND (Bronchus) Diagnosis:       Mediastinal mass      Hilar mass      (Mediastinal mass [J98.59])      (Hilar mass [R91.8])    Surgeons: Veronica Mcintyre DO Provider: Raymond Bartholomew MD    Anesthesia Type: general ASA Status: 2            Anesthesia Type: general    Vitals  No vitals data found for the desired time range.          Post Anesthesia Care and Evaluation    Patient location during evaluation: PACU  Patient participation: complete - patient participated  Level of consciousness: awake and alert  Pain management: adequate    Airway patency: patent  Anesthetic complications: No anesthetic complications  PONV Status: none  Cardiovascular status: hemodynamically stable and acceptable  Respiratory status: nonlabored ventilation, acceptable and nasal cannula  Hydration status: acceptable

## 2024-06-11 NOTE — ANESTHESIA PREPROCEDURE EVALUATION
Anesthesia Evaluation     Patient summary reviewed and Nursing notes reviewed   NPO Solid Status: > 8 hours  NPO Liquid Status: > 2 hours           Airway   Mallampati: II  TM distance: >3 FB  Neck ROM: full  No difficulty expected  Dental      Pulmonary    (-) shortness of breath, recent URI, sleep apnea, not a smoker, no home oxygen    ROS comment: Asymptomatic Large right hilar and mediastinal mass  Cardiovascular     ECG reviewed    (+) hypertension, hyperlipidemia  (-) past MI, dysrhythmias, angina, cardiac stents    ROS comment: ECG SR   ECHO 2023:  LVEF 57 +/- 5%. No significant valvular abnormalities. Visualized aorta is borderline dilated with maximal dimension of 3.8 cm at the mid-distal ascending aorta.   Has been followed for years       Neuro/Psych  (-) seizures, CVA  GI/Hepatic/Renal/Endo    (-) no renal disease, diabetes, no thyroid disorder    Musculoskeletal     Abdominal    Substance History      OB/GYN          Other        (-) history of cancer (skin)  ROS/Med Hx Other: Labs WNL                 Anesthesia Plan    ASA 2     general     intravenous induction     Anesthetic plan, risks, benefits, and alternatives have been provided, discussed and informed consent has been obtained with: patient.    Plan discussed with CRNA.      CODE STATUS:

## 2024-06-18 ENCOUNTER — TELEPHONE (OUTPATIENT)
Dept: PULMONOLOGY | Facility: CLINIC | Age: 59
End: 2024-06-18
Payer: COMMERCIAL

## 2024-06-18 LAB — REF LAB TEST METHOD: NORMAL

## 2024-06-20 ENCOUNTER — TELEPHONE (OUTPATIENT)
Dept: PULMONOLOGY | Facility: CLINIC | Age: 59
End: 2024-06-20
Payer: COMMERCIAL

## 2024-06-20 DIAGNOSIS — R91.8 HILAR MASS: ICD-10-CM

## 2024-06-20 DIAGNOSIS — J98.59 MEDIASTINAL MASS: Primary | ICD-10-CM

## 2024-06-20 NOTE — TELEPHONE ENCOUNTER
Contacted patient and left a voicemail regarding her biopsy results which were unrevealing for malignancy.    Per Dr. Mcintyre's recommendation a PET scan has been ordered.

## 2024-06-20 NOTE — TELEPHONE ENCOUNTER
Patient notified path report looks to be negative and upon Case review we will let her know if anything changes .

## 2024-06-21 ENCOUNTER — TELEPHONE (OUTPATIENT)
Dept: PULMONOLOGY | Facility: CLINIC | Age: 59
End: 2024-06-21
Payer: COMMERCIAL

## 2024-06-21 NOTE — TELEPHONE ENCOUNTER
Notified patient that  biopsy results were unrevealing for malignancy and Dr. Mcintyre is recommending a PET scan ,this has been ordered and they will call her to get this scheduled pending insurance approval. Patient voiced understanding

## 2024-07-08 ENCOUNTER — PATIENT MESSAGE (OUTPATIENT)
Dept: PULMONOLOGY | Facility: CLINIC | Age: 59
End: 2024-07-08
Payer: COMMERCIAL

## 2024-07-15 ENCOUNTER — HOSPITAL ENCOUNTER (OUTPATIENT)
Facility: HOSPITAL | Age: 59
Discharge: HOME | End: 2024-07-15
Admitting: NURSE PRACTITIONER
Payer: COMMERCIAL

## 2024-07-15 ENCOUNTER — HOSPITAL ENCOUNTER (OUTPATIENT)
Facility: HOSPITAL | Age: 59
Discharge: HOME | End: 2024-07-15
Payer: COMMERCIAL

## 2024-07-15 DIAGNOSIS — J98.59 MEDIASTINAL MASS: ICD-10-CM

## 2024-07-15 DIAGNOSIS — R91.8 HILAR MASS: ICD-10-CM

## 2024-07-15 LAB — GLUCOSE BLDC GLUCOMTR-MCNC: 90 MG/DL (ref 70–130)

## 2024-07-15 PROCEDURE — 78815 PET IMAGE W/CT SKULL-THIGH: CPT

## 2024-07-15 PROCEDURE — A9552 F18 FDG: HCPCS | Performed by: NURSE PRACTITIONER

## 2024-07-15 PROCEDURE — 0 FLUDEOXYGLUCOSE F18 SOLUTION: Performed by: NURSE PRACTITIONER

## 2024-07-15 PROCEDURE — 82948 REAGENT STRIP/BLOOD GLUCOSE: CPT

## 2024-07-15 RX ADMIN — FLUDEOXYGLUCOSE F18 1 DOSE: 300 INJECTION INTRAVENOUS at 08:18

## 2024-07-16 ENCOUNTER — TRANSCRIBE ORDERS (OUTPATIENT)
Dept: ADMINISTRATIVE | Facility: HOSPITAL | Age: 59
End: 2024-07-16
Payer: COMMERCIAL

## 2024-07-16 ENCOUNTER — PATIENT MESSAGE (OUTPATIENT)
Dept: PULMONOLOGY | Facility: CLINIC | Age: 59
End: 2024-07-16
Payer: COMMERCIAL

## 2024-07-16 DIAGNOSIS — Z12.31 BREAST CANCER SCREENING BY MAMMOGRAM: Primary | ICD-10-CM

## 2024-07-17 ENCOUNTER — PATIENT MESSAGE (OUTPATIENT)
Age: 59
End: 2024-07-17
Payer: COMMERCIAL

## 2024-07-18 ENCOUNTER — OFFICE VISIT (OUTPATIENT)
Dept: PULMONOLOGY | Facility: CLINIC | Age: 59
End: 2024-07-18
Payer: COMMERCIAL

## 2024-07-18 VITALS
HEART RATE: 80 BPM | WEIGHT: 187 LBS | TEMPERATURE: 98 F | BODY MASS INDEX: 28.34 KG/M2 | SYSTOLIC BLOOD PRESSURE: 110 MMHG | OXYGEN SATURATION: 98 % | HEIGHT: 68 IN | DIASTOLIC BLOOD PRESSURE: 78 MMHG

## 2024-07-18 DIAGNOSIS — R59.0 MEDIASTINAL ADENOPATHY: Primary | ICD-10-CM

## 2024-07-18 NOTE — PROGRESS NOTES
"Pulmonary Office Follow Up      Subjective   Chief Complaint: Follow up    Debbie Morris is a 58 y.o. female is being seen in follow up for lymphadenopathy    History of Present Illness    Debbie Morris is a 58 y.o. female with a history of melanoma who was referred to Dr. Gomez for an ascending aortic aneurysm. A CTA of the chest was ordered and showed a large right hilar and mediastinal mass encasing the pulmonary arteries and bronchi and narrowing the lobar pulmonary arteries and bronchi concerning for a primary malignancy. She is referred for bronchoscopy with EBUS for further evaluation.     She underwent a bronchoscopy with EBUS on 6/11/24. All biopsies were nondiagnostic including flow cytometry.     This was followed by a PET scan on 7/15/24 which showed strongly hypermetabolic diffuse mediastinal lymphadenopathy and small but hypermetabolic right and left lower cervical lymph nodes, and a solitary hypermetabolic left axillary node.     The following portions of the patient's history were reviewed and updated as appropriate: allergies, current medications, past family history, past medical history, past social history, past surgical history and problem list.    Review of Systems  All other systems were reviewed and are negative.  Exceptions are noted in the HPI or above.      Objective   Blood pressure 110/78, pulse 80, temperature 98 °F (36.7 °C), height 172.7 cm (68\"), weight 84.8 kg (187 lb), SpO2 98%.  Physical Exam  Constitutional:       General: She is not in acute distress.     Appearance: She is well-developed.   HENT:      Head: Normocephalic and atraumatic.      Right Ear: External ear normal.      Left Ear: External ear normal.      Nose: Nose normal.   Eyes:      Pupils: Pupils are equal, round, and reactive to light.   Neck:      Trachea: No tracheal deviation.   Pulmonary:      Effort: Pulmonary effort is normal. No respiratory distress.   Musculoskeletal:         General: Normal range of " motion.      Cervical back: Normal range of motion and neck supple.   Skin:     General: Skin is warm.      Findings: No erythema or rash.      Nails: There is no clubbing.   Neurological:      Mental Status: She is alert and oriented to person, place, and time.   Psychiatric:         Behavior: Behavior normal.         Thought Content: Thought content normal.         Judgment: Judgment normal.         PFTs:  No PFTs available.     Imaging:  PET scan from 7/15/24 reviewed. There is strongly hypermetabolic diffuse mediastinal lymphadenopathy and small but hypermetabolic right and left lower cervical lymph nodes, and a solitary hypermetabolic left axillary node.    Assessment & Plan   Diagnoses and all orders for this visit:    1. Mediastinal adenopathy (Primary)  -     Ambulatory Referral to Hematology        Discussion:  Ms. Morris is a 59yo F who is followed for adenopathy.     1. Mediastinal/Hilar Lymphadenopathy  - S/p bronchoscopy with EBUS and biopsy of 3 different sites with lymphoid tissue obtained that was non-diagnostic on cytology and flow cytometry.   - PET scan with hypermetabolic diffuse mediastinal lymphadenopathy and small but hypermetabolic right and left lower cervical lymph nodes and a solitary hypermetabolic left axillary node.   - I have discussed with Ms. Morris today that the findings are suspicious for lymphoma but we need to obtain a tissue sample for diagnostic confirmation. As core biopsies from the EBUS were non-diagnostic, I do not think that a core biopsy from a different area will be helpful and a larger piece of tissue vs a whole lymph node will need to be obtained for further evaluation. We will refer her to Surgical Oncology for a biopsy of the left axillary lymph node.   - I will go ahead and refer her to Oncology as well so that she will not have to wait for a referral after the biopsy is obtained.     She will call with any further questions.      Debbie Morris  reports that she  has never smoked. She has never used smokeless tobacco.     This was an extended visit. I have spent 41 minutes reviewing the patient record, face to face with the patient in discussion of test results and plans for further management and in documentation and coordination of care. Excluding time spent on other separate services such as performing procedures or test interpretation, if applicable.    Veronica V Case, DO  Pulmonary and Critical Care Medicine  Note Electronically Signed

## 2024-07-25 DIAGNOSIS — F33.1 MODERATE EPISODE OF RECURRENT MAJOR DEPRESSIVE DISORDER: ICD-10-CM

## 2024-07-25 RX ORDER — BUPROPION HYDROCHLORIDE 150 MG/1
150 TABLET ORAL EVERY MORNING
Qty: 90 TABLET | Refills: 1 | Status: SHIPPED | OUTPATIENT
Start: 2024-07-25

## 2024-07-25 NOTE — TELEPHONE ENCOUNTER
Rx Refill Note  Requested Prescriptions     Pending Prescriptions Disp Refills    buPROPion XL (WELLBUTRIN XL) 150 MG 24 hr tablet 90 tablet 1     Sig: Take 1 tablet by mouth Every Morning.      Last office visit with prescribing clinician: 5/30/2024   Last telemedicine visit with prescribing clinician: Visit date not found   Next office visit with prescribing clinician: 12/6/2024       Bere Montez Rep  07/25/24, 07:59 EDT    Rx sent

## 2024-08-02 ENCOUNTER — PATIENT OUTREACH (OUTPATIENT)
Dept: ONCOLOGY | Facility: CLINIC | Age: 59
End: 2024-08-02
Payer: COMMERCIAL

## 2024-08-02 ENCOUNTER — PRE-ADMISSION TESTING (OUTPATIENT)
Dept: PREADMISSION TESTING | Facility: HOSPITAL | Age: 59
End: 2024-08-02
Payer: COMMERCIAL

## 2024-08-02 VITALS — WEIGHT: 186.51 LBS | BODY MASS INDEX: 27.62 KG/M2 | HEIGHT: 69 IN

## 2024-08-02 LAB
DEPRECATED RDW RBC AUTO: 43.2 FL (ref 37–54)
ERYTHROCYTE [DISTWIDTH] IN BLOOD BY AUTOMATED COUNT: 12.5 % (ref 12.3–15.4)
HCT VFR BLD AUTO: 44.5 % (ref 34–46.6)
HGB BLD-MCNC: 14.5 G/DL (ref 12–15.9)
MCH RBC QN AUTO: 30.5 PG (ref 26.6–33)
MCHC RBC AUTO-ENTMCNC: 32.6 G/DL (ref 31.5–35.7)
MCV RBC AUTO: 93.5 FL (ref 79–97)
PLATELET # BLD AUTO: 330 10*3/MM3 (ref 140–450)
PMV BLD AUTO: 9.4 FL (ref 6–12)
POTASSIUM SERPL-SCNC: 4.2 MMOL/L (ref 3.5–5.2)
RBC # BLD AUTO: 4.76 10*6/MM3 (ref 3.77–5.28)
WBC NRBC COR # BLD AUTO: 7.47 10*3/MM3 (ref 3.4–10.8)

## 2024-08-02 PROCEDURE — 36415 COLL VENOUS BLD VENIPUNCTURE: CPT

## 2024-08-02 PROCEDURE — 84132 ASSAY OF SERUM POTASSIUM: CPT

## 2024-08-02 PROCEDURE — 85027 COMPLETE CBC AUTOMATED: CPT

## 2024-08-02 NOTE — PAT
An arrival time for procedure was not provided during PAT visit. If patient had any questions or concerns about their arrival time, they were instructed to contact their surgeon/physician.  Additionally, if the patient referred to an arrival time that was acquired from their my chart account, patient was encouraged to verify that time with their surgeon/physician. Arrival times are NOT provided in Pre Admission Testing Department.    Per Anesthesia Request, patient instructed not to take their ACE/ARB medications on the AM of surgery.    Patient denies any current skin issues.     Patient to apply Chlorhexadine wipes  to surgical area (as instructed) the night before procedure and the AM of procedure. Wipes provided.    Patient viewed general PAT education video as instructed in their preoperative information received from their surgeon.  Patient stated the general PAT education video was viewed in its entirety and survey completed.  Copies of PAT general education handouts (Incentive Spirometry, Meds to Beds Program, Patient Belongings, Pre-op skin preparation instructions, Blood Glucose testing, Visitor policy, Surgery FAQ, Code H) distributed to patient if not printed. Education related to the PAT pass and skin preparation for surgery (if applicable) completed in PAT as a reinforcement to PAT education video. Patient instructed to return PAT pass provided today as well as completed skin preparation sheet (if applicable) on the day of procedure.     Additionally if patient had not viewed video yet but intended to view it at home or in our waiting area, then referred them to the handout with QR code/link provided during PAT visit.  Instructed patient to complete survey after viewing the video in its entirety.  Encouraged patient/family to read PAT general education handouts thoroughly and notify PAT staff with any questions or concerns. Patient verbalized understanding of all information and priority  content.    EKG on chart from 5/29/24, patient denies any chest pain or shortness of breath.

## 2024-08-05 DIAGNOSIS — F33.1 MODERATE EPISODE OF RECURRENT MAJOR DEPRESSIVE DISORDER: ICD-10-CM

## 2024-08-05 RX ORDER — VENLAFAXINE HYDROCHLORIDE 75 MG/1
75 CAPSULE, EXTENDED RELEASE ORAL DAILY
Qty: 90 CAPSULE | Refills: 1 | Status: SHIPPED | OUTPATIENT
Start: 2024-08-05

## 2024-08-08 ENCOUNTER — ANESTHESIA EVENT (OUTPATIENT)
Dept: PERIOP | Facility: HOSPITAL | Age: 59
End: 2024-08-08
Payer: COMMERCIAL

## 2024-08-08 RX ORDER — FAMOTIDINE 10 MG/ML
20 INJECTION, SOLUTION INTRAVENOUS ONCE
Status: CANCELLED | OUTPATIENT
Start: 2024-08-08 | End: 2024-08-08

## 2024-08-09 ENCOUNTER — ANESTHESIA (OUTPATIENT)
Dept: PERIOP | Facility: HOSPITAL | Age: 59
End: 2024-08-09
Payer: COMMERCIAL

## 2024-08-09 ENCOUNTER — HOSPITAL ENCOUNTER (OUTPATIENT)
Facility: HOSPITAL | Age: 59
Setting detail: HOSPITAL OUTPATIENT SURGERY
Discharge: HOME | End: 2024-08-09
Attending: STUDENT IN AN ORGANIZED HEALTH CARE EDUCATION/TRAINING PROGRAM | Admitting: STUDENT IN AN ORGANIZED HEALTH CARE EDUCATION/TRAINING PROGRAM
Payer: COMMERCIAL

## 2024-08-09 VITALS
DIASTOLIC BLOOD PRESSURE: 74 MMHG | BODY MASS INDEX: 27.62 KG/M2 | WEIGHT: 186.51 LBS | TEMPERATURE: 97.7 F | RESPIRATION RATE: 18 BRPM | SYSTOLIC BLOOD PRESSURE: 124 MMHG | HEART RATE: 81 BPM | OXYGEN SATURATION: 93 % | HEIGHT: 69 IN

## 2024-08-09 DIAGNOSIS — R59.1 LYMPHADENOPATHY: ICD-10-CM

## 2024-08-09 PROCEDURE — 88333 PATH CONSLTJ SURG CYTO XM 1: CPT | Performed by: STUDENT IN AN ORGANIZED HEALTH CARE EDUCATION/TRAINING PROGRAM

## 2024-08-09 PROCEDURE — 88305 TISSUE EXAM BY PATHOLOGIST: CPT | Performed by: STUDENT IN AN ORGANIZED HEALTH CARE EDUCATION/TRAINING PROGRAM

## 2024-08-09 PROCEDURE — 25010000002 PROPOFOL 10 MG/ML EMULSION

## 2024-08-09 PROCEDURE — 25010000002 GLYCOPYRROLATE 1 MG/5ML SOLUTION

## 2024-08-09 PROCEDURE — 25810000003 LACTATED RINGERS PER 1000 ML: Performed by: ANESTHESIOLOGY

## 2024-08-09 PROCEDURE — 25010000002 FENTANYL CITRATE (PF) 50 MCG/ML SOLUTION

## 2024-08-09 PROCEDURE — 25010000002 ONDANSETRON PER 1 MG

## 2024-08-09 PROCEDURE — 25010000002 DEXAMETHASONE PER 1 MG

## 2024-08-09 PROCEDURE — 25010000002 CEFAZOLIN PER 500 MG: Performed by: STUDENT IN AN ORGANIZED HEALTH CARE EDUCATION/TRAINING PROGRAM

## 2024-08-09 PROCEDURE — 25010000002 FENTANYL CITRATE (PF) 100 MCG/2ML SOLUTION

## 2024-08-09 DEVICE — LIGACLIP MCA MULTIPLE CLIP APPLIERS, 20 MEDIUM CLIPS
Type: IMPLANTABLE DEVICE | Site: AXILLA | Status: FUNCTIONAL
Brand: LIGACLIP

## 2024-08-09 RX ORDER — FAMOTIDINE 20 MG/1
20 TABLET, FILM COATED ORAL ONCE
Status: COMPLETED | OUTPATIENT
Start: 2024-08-09 | End: 2024-08-09

## 2024-08-09 RX ORDER — LIDOCAINE HYDROCHLORIDE 10 MG/ML
INJECTION, SOLUTION EPIDURAL; INFILTRATION; INTRACAUDAL; PERINEURAL AS NEEDED
Status: DISCONTINUED | OUTPATIENT
Start: 2024-08-09 | End: 2024-08-09 | Stop reason: SURG

## 2024-08-09 RX ORDER — PROMETHAZINE HYDROCHLORIDE 25 MG/1
25 TABLET ORAL ONCE AS NEEDED
Status: DISCONTINUED | OUTPATIENT
Start: 2024-08-09 | End: 2024-08-09 | Stop reason: HOSPADM

## 2024-08-09 RX ORDER — EPHEDRINE SULFATE 50 MG/ML
INJECTION INTRAVENOUS AS NEEDED
Status: DISCONTINUED | OUTPATIENT
Start: 2024-08-09 | End: 2024-08-09 | Stop reason: SURG

## 2024-08-09 RX ORDER — SODIUM CHLORIDE 0.9 % (FLUSH) 0.9 %
10 SYRINGE (ML) INJECTION EVERY 12 HOURS SCHEDULED
Status: DISCONTINUED | OUTPATIENT
Start: 2024-08-09 | End: 2024-08-09 | Stop reason: HOSPADM

## 2024-08-09 RX ORDER — SODIUM CHLORIDE, SODIUM LACTATE, POTASSIUM CHLORIDE, CALCIUM CHLORIDE 600; 310; 30; 20 MG/100ML; MG/100ML; MG/100ML; MG/100ML
9 INJECTION, SOLUTION INTRAVENOUS CONTINUOUS
Status: DISCONTINUED | OUTPATIENT
Start: 2024-08-09 | End: 2024-08-09 | Stop reason: HOSPADM

## 2024-08-09 RX ORDER — FENTANYL CITRATE 50 UG/ML
50 INJECTION, SOLUTION INTRAMUSCULAR; INTRAVENOUS
Status: DISCONTINUED | OUTPATIENT
Start: 2024-08-09 | End: 2024-08-09 | Stop reason: HOSPADM

## 2024-08-09 RX ORDER — SODIUM CHLORIDE 9 MG/ML
40 INJECTION, SOLUTION INTRAVENOUS AS NEEDED
Status: DISCONTINUED | OUTPATIENT
Start: 2024-08-09 | End: 2024-08-09 | Stop reason: HOSPADM

## 2024-08-09 RX ORDER — ONDANSETRON 2 MG/ML
INJECTION INTRAMUSCULAR; INTRAVENOUS AS NEEDED
Status: DISCONTINUED | OUTPATIENT
Start: 2024-08-09 | End: 2024-08-09 | Stop reason: SURG

## 2024-08-09 RX ORDER — FENTANYL CITRATE 50 UG/ML
INJECTION, SOLUTION INTRAMUSCULAR; INTRAVENOUS
Status: COMPLETED
Start: 2024-08-09 | End: 2024-08-09

## 2024-08-09 RX ORDER — ONDANSETRON 2 MG/ML
4 INJECTION INTRAMUSCULAR; INTRAVENOUS ONCE AS NEEDED
Status: DISCONTINUED | OUTPATIENT
Start: 2024-08-09 | End: 2024-08-09 | Stop reason: HOSPADM

## 2024-08-09 RX ORDER — MEPERIDINE HYDROCHLORIDE 25 MG/ML
12.5 INJECTION INTRAMUSCULAR; INTRAVENOUS; SUBCUTANEOUS
Status: DISCONTINUED | OUTPATIENT
Start: 2024-08-09 | End: 2024-08-09 | Stop reason: HOSPADM

## 2024-08-09 RX ORDER — MIDAZOLAM HYDROCHLORIDE 1 MG/ML
1 INJECTION INTRAMUSCULAR; INTRAVENOUS
Status: DISCONTINUED | OUTPATIENT
Start: 2024-08-09 | End: 2024-08-09 | Stop reason: HOSPADM

## 2024-08-09 RX ORDER — GLYCOPYRROLATE 0.2 MG/ML
INJECTION INTRAMUSCULAR; INTRAVENOUS AS NEEDED
Status: DISCONTINUED | OUTPATIENT
Start: 2024-08-09 | End: 2024-08-09 | Stop reason: SURG

## 2024-08-09 RX ORDER — DEXMEDETOMIDINE HYDROCHLORIDE 4 UG/ML
INJECTION, SOLUTION INTRAVENOUS AS NEEDED
Status: DISCONTINUED | OUTPATIENT
Start: 2024-08-09 | End: 2024-08-09 | Stop reason: SURG

## 2024-08-09 RX ORDER — LABETALOL HYDROCHLORIDE 5 MG/ML
5 INJECTION, SOLUTION INTRAVENOUS
Status: DISCONTINUED | OUTPATIENT
Start: 2024-08-09 | End: 2024-08-09 | Stop reason: HOSPADM

## 2024-08-09 RX ORDER — HYDROCODONE BITARTRATE AND ACETAMINOPHEN 5; 325 MG/1; MG/1
TABLET ORAL
Status: COMPLETED
Start: 2024-08-09 | End: 2024-08-09

## 2024-08-09 RX ORDER — HYDROMORPHONE HYDROCHLORIDE 1 MG/ML
0.5 INJECTION, SOLUTION INTRAMUSCULAR; INTRAVENOUS; SUBCUTANEOUS
Status: DISCONTINUED | OUTPATIENT
Start: 2024-08-09 | End: 2024-08-09 | Stop reason: HOSPADM

## 2024-08-09 RX ORDER — PROPOFOL 10 MG/ML
VIAL (ML) INTRAVENOUS AS NEEDED
Status: DISCONTINUED | OUTPATIENT
Start: 2024-08-09 | End: 2024-08-09 | Stop reason: SURG

## 2024-08-09 RX ORDER — HYDRALAZINE HYDROCHLORIDE 20 MG/ML
5 INJECTION INTRAMUSCULAR; INTRAVENOUS
Status: DISCONTINUED | OUTPATIENT
Start: 2024-08-09 | End: 2024-08-09 | Stop reason: HOSPADM

## 2024-08-09 RX ORDER — DROPERIDOL 2.5 MG/ML
0.62 INJECTION, SOLUTION INTRAMUSCULAR; INTRAVENOUS ONCE AS NEEDED
Status: DISCONTINUED | OUTPATIENT
Start: 2024-08-09 | End: 2024-08-09 | Stop reason: HOSPADM

## 2024-08-09 RX ORDER — DEXAMETHASONE SODIUM PHOSPHATE 4 MG/ML
INJECTION, SOLUTION INTRA-ARTICULAR; INTRALESIONAL; INTRAMUSCULAR; INTRAVENOUS; SOFT TISSUE AS NEEDED
Status: DISCONTINUED | OUTPATIENT
Start: 2024-08-09 | End: 2024-08-09 | Stop reason: SURG

## 2024-08-09 RX ORDER — DROPERIDOL 2.5 MG/ML
0.62 INJECTION, SOLUTION INTRAMUSCULAR; INTRAVENOUS
Status: DISCONTINUED | OUTPATIENT
Start: 2024-08-09 | End: 2024-08-09 | Stop reason: HOSPADM

## 2024-08-09 RX ORDER — NALOXONE HCL 0.4 MG/ML
0.4 VIAL (ML) INJECTION AS NEEDED
Status: DISCONTINUED | OUTPATIENT
Start: 2024-08-09 | End: 2024-08-09 | Stop reason: HOSPADM

## 2024-08-09 RX ORDER — SODIUM CHLORIDE 0.9 % (FLUSH) 0.9 %
3-10 SYRINGE (ML) INJECTION AS NEEDED
Status: DISCONTINUED | OUTPATIENT
Start: 2024-08-09 | End: 2024-08-09 | Stop reason: HOSPADM

## 2024-08-09 RX ORDER — SODIUM CHLORIDE 0.9 % (FLUSH) 0.9 %
10 SYRINGE (ML) INJECTION AS NEEDED
Status: DISCONTINUED | OUTPATIENT
Start: 2024-08-09 | End: 2024-08-09 | Stop reason: HOSPADM

## 2024-08-09 RX ORDER — SODIUM CHLORIDE 0.9 % (FLUSH) 0.9 %
3 SYRINGE (ML) INJECTION EVERY 12 HOURS SCHEDULED
Status: DISCONTINUED | OUTPATIENT
Start: 2024-08-09 | End: 2024-08-09 | Stop reason: HOSPADM

## 2024-08-09 RX ORDER — FENTANYL CITRATE 50 UG/ML
INJECTION, SOLUTION INTRAMUSCULAR; INTRAVENOUS AS NEEDED
Status: DISCONTINUED | OUTPATIENT
Start: 2024-08-09 | End: 2024-08-09 | Stop reason: SURG

## 2024-08-09 RX ORDER — PHENYLEPHRINE HCL IN 0.9% NACL 1 MG/10 ML
SYRINGE (ML) INTRAVENOUS AS NEEDED
Status: DISCONTINUED | OUTPATIENT
Start: 2024-08-09 | End: 2024-08-09 | Stop reason: SURG

## 2024-08-09 RX ORDER — HYDROCODONE BITARTRATE AND ACETAMINOPHEN 5; 325 MG/1; MG/1
1 TABLET ORAL ONCE AS NEEDED
Status: DISCONTINUED | OUTPATIENT
Start: 2024-08-09 | End: 2024-08-09 | Stop reason: HOSPADM

## 2024-08-09 RX ORDER — TRAMADOL HYDROCHLORIDE 50 MG/1
50 TABLET ORAL EVERY 6 HOURS PRN
Qty: 10 TABLET | Refills: 0 | Status: SHIPPED | OUTPATIENT
Start: 2024-08-09

## 2024-08-09 RX ORDER — LIDOCAINE HYDROCHLORIDE 10 MG/ML
0.5 INJECTION, SOLUTION EPIDURAL; INFILTRATION; INTRACAUDAL; PERINEURAL ONCE AS NEEDED
Status: DISCONTINUED | OUTPATIENT
Start: 2024-08-09 | End: 2024-08-09 | Stop reason: HOSPADM

## 2024-08-09 RX ORDER — PROMETHAZINE HYDROCHLORIDE 25 MG/1
25 SUPPOSITORY RECTAL ONCE AS NEEDED
Status: DISCONTINUED | OUTPATIENT
Start: 2024-08-09 | End: 2024-08-09 | Stop reason: HOSPADM

## 2024-08-09 RX ORDER — IPRATROPIUM BROMIDE AND ALBUTEROL SULFATE 2.5; .5 MG/3ML; MG/3ML
3 SOLUTION RESPIRATORY (INHALATION) ONCE AS NEEDED
Status: DISCONTINUED | OUTPATIENT
Start: 2024-08-09 | End: 2024-08-09 | Stop reason: HOSPADM

## 2024-08-09 RX ADMIN — DEXAMETHASONE SODIUM PHOSPHATE 8 MG: 4 INJECTION INTRA-ARTICULAR; INTRALESIONAL; INTRAMUSCULAR; INTRAVENOUS; SOFT TISSUE at 13:22

## 2024-08-09 RX ADMIN — Medication 100 MCG: at 13:52

## 2024-08-09 RX ADMIN — SODIUM CHLORIDE, POTASSIUM CHLORIDE, SODIUM LACTATE AND CALCIUM CHLORIDE 9 ML/HR: 600; 310; 30; 20 INJECTION, SOLUTION INTRAVENOUS at 12:18

## 2024-08-09 RX ADMIN — FENTANYL CITRATE 50 MCG: 50 INJECTION, SOLUTION INTRAMUSCULAR; INTRAVENOUS at 14:43

## 2024-08-09 RX ADMIN — LIDOCAINE HYDROCHLORIDE 50 MG: 10 SOLUTION INTRAVENOUS at 13:17

## 2024-08-09 RX ADMIN — FENTANYL CITRATE 50 MCG: 50 INJECTION, SOLUTION INTRAMUSCULAR; INTRAVENOUS at 13:42

## 2024-08-09 RX ADMIN — Medication 100 MCG: at 13:58

## 2024-08-09 RX ADMIN — EPHEDRINE SULFATE 20 MG: 50 INJECTION INTRAVENOUS at 13:25

## 2024-08-09 RX ADMIN — FAMOTIDINE 20 MG: 20 TABLET, FILM COATED ORAL at 12:18

## 2024-08-09 RX ADMIN — HYDROCODONE BITARTRATE AND ACETAMINOPHEN 1 TABLET: 5; 325 TABLET ORAL at 14:25

## 2024-08-09 RX ADMIN — Medication 100 MCG: at 13:28

## 2024-08-09 RX ADMIN — GLYCOPYRROLATE 0.2 MCG: 0.2 INJECTION INTRAMUSCULAR; INTRAVENOUS at 13:28

## 2024-08-09 RX ADMIN — PROPOFOL 200 MG: 10 INJECTION, EMULSION INTRAVENOUS at 13:17

## 2024-08-09 RX ADMIN — DEXMEDETOMIDINE HYDROCHLORIDE IN 0.9% SODIUM CHLORIDE 8 MCG: 4 INJECTION INTRAVENOUS at 13:49

## 2024-08-09 RX ADMIN — ONDANSETRON 4 MG: 2 INJECTION INTRAMUSCULAR; INTRAVENOUS at 13:55

## 2024-08-09 RX ADMIN — FENTANYL CITRATE 50 MCG: 50 INJECTION, SOLUTION INTRAMUSCULAR; INTRAVENOUS at 13:23

## 2024-08-09 RX ADMIN — SODIUM CHLORIDE 2000 MG: 900 INJECTION INTRAVENOUS at 13:17

## 2024-08-09 NOTE — OP NOTE
OPERATIVE NOTE    Patient Name:  Debbie Morris  YOB: 1965  [id number]     Date of Surgery:  8/9/2024     Pre-op Diagnosis: Left axillary ymphadenopathy    Post-op Diagnosis: Left axillary lymphadenopathy      Procedure:   Left axillary excisional lymph node biopsy    Surgeon: Albert Hair MD    Anesthesia: General    This procedure was not performed to treat cancer     Estimated Blood Loss: minimal    Complications: None apparent    Implants:    Implant Name Type Inv. Item Serial No.  Lot No. LRB No. Used Action   CLIPAPPLR M/ ENDO LIGACLIP 20CLP 11IN MD - [id number] Implant CLIPAPPLR M/ ENDO LIGACLIP 20CLP 11IN MD  [facility]  DIV OF [employer] J [id number] Left 1 Implanted       Specimen:          Specimens       ID Source Type Tests Collected By Collected At Frozen?    A Axilla, Left Lymph Node FLOW CYTOMETRY (Canceled)   Albert Hair MD 8/9/24 8450     Description: left axillary lymph node for flow cytometry              Findings: Enlarged left axillary lymph node    Indications:  Mrs. Debbie Morris is a 58 year old with a history of melanoma in situ of the right shin presenting with incidental mediastinal, right hilar, cervical, and left axillary lymphadenopathy. She is otherwise asymptomatic, but the left axillary lymph node was the most ideal target for biopsy. She was taken to the OR today for excisional lymph node biopsy.     Description of Procedure:   After informed consent was obtained, patient identification verified, and pre operative checklist completed, the patient was brought to the Operating Room and placed comfortably in the supine position on the operating table.  The patient was given appropriate antimicrobial prophylaxis.    General anesthesia was administered without complication and an LMA was placed.   The patient’s left axilla was prepped and draped in the usual sterile fashion. After an appropriate surgical pause and time out was completed, a vertical  incision was made over the area of the palpable lymph node in the left axilla. Dissection was carried through the subcutaneous tissue with cautery and the clavipectoral fascia was divided to enter the contents of the axilla. The palpable lymph node was isolated and introduced into the field. It was circumferentially dissected free clipping vessels and small lymphatics. It was passed off for fresh pathology for flow cytometry. The resulting wound was irrigated with saline. Hemostasis was achieved. The clavipectoral fascia was closed with a running 3-0 Vicryl suture. The dermal layer was closed with interrupted 3-0 Vicryl sutures. The skin layer was closed with a running 4-0 Monocryl suture. The wound was dressed with steri strips and a sterile dressing due to the patient's glue allergy.    All sponge and needle counts were correct times two at the completion of the procedure. The patient recovered from anesthesia, was extubated in the operating room and was transported to the PACU in stable condition.      Albert Hair MD     Date: 8/9/2024  Time: 14:03 EDT

## 2024-08-09 NOTE — DISCHARGE INSTRUCTIONS
[facility]:  DISCHARGE INSTRUCTIONS  Dr. Albert Hair    DIET  Okay to resume a regular diet.      PAIN MANAGEMENT  Pain is normal after surgery, but should be able to be managed.     Recommend alternatin mg acetaminophen (Tylenol) every 6 hours*   600-800 mg ibuprofen (Motrin, Advil, etc.) every 6 hours  *Do not take more than 4000 mg of Tylenol in a single day.     If you received a prescription for pain medication after surgery, use this for breakthrough moderate or severe pain if not covered by acetaminophen or ibuprofen.  Okay to use warm and cool compresses.     Bowel Regimen  Prescribed pain medications may cause constipation. Any over-the-counter bowel regimen medications will help with these symptoms.     Recommended regimen:  Miralax 17g in zero sugar Gatorade/Powerade once daily  Senna laxative once to twice daily  Metamucil or other fiber supplement daily  If still constipated - milk of Magnesia or okay to try suppositories or enemas unless directed otherwise by surgeon      DISCHARGE ACTIVITY  Activity is as tolerated.   Okay to walk the night of surgery. Recommend walking at least 4 times daily to prevent complications  No excessive twisting/bending/lifting greater than 20 lbs for 2 weeks.  May return to more strenuous exercise as pain and lifting restrictions allow. Would avoid strenuous activity for at least 1-2 weeks to allow incision to heal.    Driving  You may not drive within 24 hour of receiving narcotic pain medication.   Okay to drive when not taking narcotic pain medication and your incision is not causing limitations with your reaction speed to traffic.    Return to work/school  You may return to work/school in 1-2 weeks with the above restrictions.  You may return to work/school without restrictions in 4 weeks or when your pain/activity allows.    WOUND CARE    Shower/Bathing  You may shower after 24 hours from the surgical procedure.   No tub baths, do not  submerge the incision underwater in a tub bath etc.      Wound Dressing  If dressed with adhesive glue, okay to follow shower/bathing instructions above. Keep site clean and dry.    If dressed with gauze bandages and steri strips. Okay to remove the outer bandage immediately after exiting your first shower and remove the Steri-Strips if still in place after 7 days.    Please call our office, [phone], if you develop increased pain, redness, yellow/purulent discharge, or fevers/chills as this may indicate an infection    FOLLOW-UP  You will be scheduled a follow-up appointment 1-2 weeks after discharge.   [facility] will call you to schedule.    If you do not hear from anyone to schedule, please call [phone] to ask for an appointment 2 weeks from your surgery or discharge date.    CONCERNING SIGNS AND SYMPTOMS  1. Fevers/chills/flu-like symptoms  2. Increasing pain at the surgical site  3. Redness around incisions  4. Purulent drainage from incisions  5. Any other symptoms that are concerning to you    If you develop any of the signs or symptoms above, please call our office ([phone]) or after-hours line (). If unable to reach us, or symptoms are severe, please go to the ER for evaluation.

## 2024-08-09 NOTE — ANESTHESIA POSTPROCEDURE EVALUATION
Patient: Debbie Morris    Procedure Summary       Date: 08/09/24 Room / Location:  [facility]    Anesthesia Start: 1312 Anesthesia Stop: 1412    Procedure: LEFT AXILLARY LYMPH NODE BIOPSY/EXCISION (Left: Axilla) Diagnosis:     Surgeons: Albert Hair MD Provider: Raymond Bartholomew MD    Anesthesia Type: general ASA Status: 2            Anesthesia Type: general    Vitals  Vitals Value Taken Time   /74 08/09/24 1409   Temp     Pulse 81 08/09/24 1411   Resp     SpO2 93 % 08/09/24 1411   Vitals shown include unfiled device data.        Post Anesthesia Care and Evaluation    Patient location during evaluation: PACU  Patient participation: complete - patient participated  Level of consciousness: sleepy but conscious  Pain management: adequate    Airway patency: patent  Anesthetic complications: No anesthetic complications  PONV Status: none  Cardiovascular status: hemodynamically stable and acceptable  Respiratory status: nonlabored ventilation, acceptable, nasal cannula and oral airway  Hydration status: acceptable    Comments: /74  HR 81  Sats 93  Temp 97

## 2024-08-09 NOTE — INTERVAL H&P NOTE
Pre-Op H&P (See Recent Office Note Attached for Full H&P)    History and physical note from office reviewed and updated with the following, otherwise there are no changes in H&P:      Review of Systems:  General ROS:  no fever, chills, rashes.  No change since last office visit.  No recent sick exposure  Cardiovascular ROS: no chest pain or dyspnea on exertion  Respiratory ROS: no cough, shortness of breath, or wheezing    Immunization History:   Immunization History   Administered Date(s) Administered    31-influenza Vac Quardvalent Preservativ 11/04/2016, 10/13/2020    COVID-19 (MODERNA) 1st,2nd,3rd Dose Monovalent 04/27/2021    Fluzone (or Fluarix & Flulaval for VFC) >6mos 11/03/2021, 11/17/2023    Influenza Quad Vaccine (Inpatient) 10/01/2019, 10/01/2020    Influenza Split Preservative Free ID 10/26/2017, 10/04/2018, 10/07/2019, 10/15/2020    Shingrix 03/09/2021, 05/21/2021    Tdap 04/27/2023         Meds:    No current facility-administered medications on file prior to encounter.     Current Outpatient Medications on File Prior to Encounter   Medication Sig Dispense Refill    amLODIPine (NORVASC) 5 MG tablet Take 1 tablet by mouth Daily.      atorvastatin (LIPITOR) 10 MG tablet Take 1 tablet by mouth Daily.      Barberry-Oreg Grape-Goldenseal (BERBERINE COMPLEX PO) Take 1 each by mouth Daily.      buPROPion XL (WELLBUTRIN XL) 150 MG 24 hr tablet Take 1 tablet by mouth Every Morning. 90 tablet 1    estradiol (CLIMARA) 0.05 MG/24HR patch Place 1 patch on the skin as directed by provider 2 (Two) Times a Week.      losartan (COZAAR) 50 MG tablet Take 1 tablet by mouth 2 (Two) Times a Day.      Progesterone (PROMETRIUM) 100 MG capsule Take 1 capsule by mouth Daily.       Allergies   Allergen Reactions    Phentermine Anaphylaxis     Other Reaction(s): Anaphylaxis-PolymyxinB, very hyper , heart racing    Lisinopril Cough    Tape Rash     Patient developed contact dermatitis from the surgical glue used for her  "laparoscopic cholecystectomy    Wound Dressing Adhesive Hives     Other Reaction(s): hives       Vital Signs:  /93 (BP Location: Right arm, Patient Position: Lying)   Pulse 87   Temp 97 °F (36.1 °C) (Temporal)   Resp 18   Ht 174 cm (68.5\")   Wt 84.6 kg (186 lb 8.2 oz)   SpO2 96%   BMI 27.94 kg/m²     Physical Exam:    CV:  S1S2 regular rate and rhythm, no murmur               Resp:  Clear to auscultation; respirations regular, even and unlabored    Results Review:     Lab Results   Component Value Date    WBC 7.47 08/02/2024    HGB 14.5 08/02/2024    HCT 44.5 08/02/2024    MCV 93.5 08/02/2024     08/02/2024    NEUTROABS 3.93 06/10/2024    GLUCOSE 96 06/10/2024    BUN 11 06/10/2024    CREATININE 0.91 06/10/2024    EGFRIFAFRI >60 11/03/2020     06/10/2024    K 4.2 08/02/2024     06/10/2024    CO2 26.0 06/10/2024    CALCIUM 9.0 06/10/2024    ALBUMIN 4.2 06/10/2024    AST 17 06/10/2024    ALT 14 06/10/2024    BILITOT 0.4 06/10/2024    PTT 28.2 06/10/2024    INR 0.97 06/10/2024   I reviewed the patient's new clinical results.    Cancer Staging (if applicable)  Cancer Patient: __ yes __no __unknown; If yes, clinical stage T:__ N:__M:__, stage group or __N/A    Assessment/Plan:   LEFT AXILLARY LYMPHADENOPATHY / LEFT AXILLARY LYMPH NODE BIOPSY/EXCISION        FRANCINE Guerin   8/9/2024   12:13 EDT    "

## 2024-08-09 NOTE — ANESTHESIA PROCEDURE NOTES
Airway  Urgency: elective    Date/Time: 8/9/2024 1:19 PM  Airway not difficult    General Information and Staff    Patient location during procedure: OR  CRNA/CAA: Donna Lind CRNA    Indications and Patient Condition  Indications for airway management: airway protection    Preoxygenated: yes  Mask difficulty assessment: 1 - vent by mask    Final Airway Details  Final airway type: supraglottic airway      Successful airway: I-gel  Size 4     Number of attempts at approach: 1  Assessment: lips, teeth, and gum same as pre-op    Additional Comments  LMA placed without difficulty, ventilation with assist, equal breath sounds and symmetric chest rise and fall

## 2024-08-09 NOTE — ANESTHESIA PREPROCEDURE EVALUATION
Anesthesia Evaluation     Patient summary reviewed and Nursing notes reviewed                Airway   Mallampati: II  TM distance: >3 FB  Neck ROM: full  No difficulty expected  Dental - normal exam     Pulmonary - negative pulmonary ROS and normal exam   Cardiovascular - normal exam    (+) hypertension, hyperlipidemia    ROS comment:   Echo 2023: normal EF, no significant valve disease    Neuro/Psych- negative ROS  GI/Hepatic/Renal/Endo - negative ROS     Musculoskeletal (-) negative ROS    Abdominal  - normal exam    Bowel sounds: normal.   Substance History - negative use     OB/GYN negative ob/gyn ROS         Other                          Anesthesia Plan    ASA 2     general     intravenous induction     Anesthetic plan, risks, benefits, and alternatives have been provided, discussed and informed consent has been obtained with: patient.    Plan discussed with CRNA.      CODE STATUS:

## 2024-08-14 LAB
CYTO UR: NORMAL
LAB AP CASE REPORT: NORMAL
LAB AP CLINICAL INFORMATION: NORMAL
LAB AP DIAGNOSIS COMMENT: NORMAL
LAB AP FLOW CYTOMETRY SUMMARY: NORMAL
PATH REPORT.FINAL DX SPEC: NORMAL
PATH REPORT.GROSS SPEC: NORMAL

## 2024-08-15 ENCOUNTER — PATIENT OUTREACH (OUTPATIENT)
Dept: ONCOLOGY | Facility: CLINIC | Age: 59
End: 2024-08-15
Payer: COMMERCIAL

## 2024-08-15 NOTE — SIGNIFICANT NOTE
Called [facility] to update them regarding biopsy 08/09/24 revealing Classic Hodgkin Lymphoma. They requested pathology report. Notified office to send report to [phone].  will call patient to set up appt. Updated patient with pathology results and she knows to be expecting call from . She is agreeable to plan and knows to call with questions or concerns.

## 2024-08-16 ENCOUNTER — OFFICE VISIT (OUTPATIENT)
Age: 59
End: 2024-08-16
Payer: COMMERCIAL

## 2024-08-16 ENCOUNTER — HOSPITAL ENCOUNTER (OUTPATIENT)
Facility: HOSPITAL | Age: 59
Discharge: HOME | End: 2024-08-16
Admitting: INTERNAL MEDICINE
Payer: COMMERCIAL

## 2024-08-16 VITALS
HEART RATE: 90 BPM | SYSTOLIC BLOOD PRESSURE: 122 MMHG | OXYGEN SATURATION: 97 % | DIASTOLIC BLOOD PRESSURE: 88 MMHG | WEIGHT: 187 LBS | BODY MASS INDEX: 27.7 KG/M2 | TEMPERATURE: 97.8 F | HEIGHT: 69 IN

## 2024-08-16 DIAGNOSIS — R59.0 MEDIASTINAL ADENOPATHY: ICD-10-CM

## 2024-08-16 DIAGNOSIS — N95.1 MENOPAUSAL STATE: ICD-10-CM

## 2024-08-16 DIAGNOSIS — F32.9 REACTIVE DEPRESSION: ICD-10-CM

## 2024-08-16 DIAGNOSIS — F41.9 ANXIETY: ICD-10-CM

## 2024-08-16 DIAGNOSIS — R91.8 HILAR MASS: ICD-10-CM

## 2024-08-16 DIAGNOSIS — C81.12 NODULAR SCLEROSIS HODGKIN LYMPHOMA OF INTRATHORACIC LYMPH NODES: ICD-10-CM

## 2024-08-16 DIAGNOSIS — R05.9 COUGH, UNSPECIFIED TYPE: Primary | ICD-10-CM

## 2024-08-16 DIAGNOSIS — R05.9 COUGH, UNSPECIFIED TYPE: ICD-10-CM

## 2024-08-16 DIAGNOSIS — Z12.11 SCREENING FOR COLON CANCER: ICD-10-CM

## 2024-08-16 LAB
EXPIRATION DATE: NORMAL
FLUAV AG UPPER RESP QL IA.RAPID: NOT DETECTED
FLUBV AG UPPER RESP QL IA.RAPID: NOT DETECTED
INTERNAL CONTROL: NORMAL
Lab: NORMAL
SARS-COV-2 AG UPPER RESP QL IA.RAPID: NOT DETECTED

## 2024-08-16 PROCEDURE — 71046 X-RAY EXAM CHEST 2 VIEWS: CPT

## 2024-08-16 RX ORDER — ALBUTEROL SULFATE 90 UG/1
AEROSOL, METERED RESPIRATORY (INHALATION)
Qty: 18 G | Refills: 1 | Status: SHIPPED | OUTPATIENT
Start: 2024-08-16

## 2024-08-17 NOTE — PROGRESS NOTES
Progress Note    Subjective      Debbie is a 58 y.o. female.    Chief Complaint   Patient presents with    Cough     6 days    Hospital Follow Up Visit     Lymph node surgery 8/9/24       Cough        Patient has been followed for a thoracic aortic aneurysm. When she established care this year post move to [place], she was due for her annual scan  Upon completion of her CTA a mediastinal mass was found.   She was referred to Pulm for bx  Bronch was completed but was unsuccessful at determining the cause  She was then referred for LN biopsy in the the OR  She had that completed approximately 1 week ago  Her path showed Hodgkins Lymphoma    She said that she has noted a deep cough that developed a few days after the procedure  She was intubated  No fever, no pleuritic cp, no red/hot/tender calves  She said that she has had some increased breathlessness with cough, talking exertion  She said that she knew there was some compression of the pulm artery and bronchi from the mass  She reports coughing up some light yellow sputum  Has had some mild nasal symptoms but feels more baseline allergic symptoms    Her Dad also unexpectedly passed away since we last saw her  She says that she feels she is so distracted trying to get through this knew diagnosis that she is not processing everything  She is afraid that when she has more time on hands and starts treatment that she may need to talk with someone    Past Medical History:  Patient Active Problem List   Diagnosis    Ascending aortic aneurysm    Anxiety    Aortic ectasia, thoracic    Carpal tunnel syndrome    Colon polyps    COVID-19    Elevated serum creatinine    Endometriosis    Elevated LDL cholesterol level    Essential hypertension    Hyperlipidemia    Hypervitaminosis B6    Migraine    Neuroma, Pitts's    Osteopenia    Postmenopausal HRT (hormone replacement therapy)    Reactive depression    Slow transit constipation    Squamous cell skin cancer    Tinnitus of both ears     Urinary incontinence, urge    Vitamin D deficiency    Mediastinal mass    Hilar mass    Mediastinal adenopathy       Medications:  Current Outpatient Medications on File Prior to Visit   Medication Sig Dispense Refill    amLODIPine (NORVASC) 5 MG tablet Take 1 tablet by mouth Daily.      atorvastatin (LIPITOR) 10 MG tablet Take 1 tablet by mouth Daily.      Barberry-Oreg Grape-Goldenseal (BERBERINE COMPLEX PO) Take 1 each by mouth Daily.      buPROPion XL (WELLBUTRIN XL) 150 MG 24 hr tablet Take 1 tablet by mouth Every Morning. 90 tablet 1    estradiol (CLIMARA) 0.05 MG/24HR patch Place 1 patch on the skin as directed by provider 2 (Two) Times a Week.      losartan (COZAAR) 50 MG tablet Take 1 tablet by mouth 2 (Two) Times a Day.      multivitamin with minerals (MULTIVITAMIN WOMENS 50+ ADV PO) Take 1 tablet by mouth Daily.      Progesterone (PROMETRIUM) 100 MG capsule Take 1 capsule by mouth Daily.      traMADol (ULTRAM) 50 MG tablet Take 1 tablet by mouth Every 6 (Six) Hours As Needed for Moderate Pain. 10 tablet 0    venlafaxine XR (EFFEXOR-XR) 75 MG 24 hr capsule Take 1 capsule by mouth Daily. 90 capsule 1     No current facility-administered medications on file prior to visit.       Allergies:   Allergies   Allergen Reactions    Phentermine Anaphylaxis     Other Reaction(s): Anaphylaxis-PolymyxinB, very hyper , heart racing    Lisinopril Cough    Tape Rash     Patient developed contact dermatitis from the surgical glue used for her laparoscopic cholecystectomy    Wound Dressing Adhesive Hives     Other Reaction(s): hives       Immunizations:  Immunization History   Administered Date(s) Administered    31-influenza Vac Quardvalent Preservativ 11/04/2016, 10/13/2020    COVID-19 (MODERNA) 1st,2nd,3rd Dose Monovalent 04/27/2021    Fluzone  >6mos 10/01/2019, 10/01/2020    Fluzone (or Fluarix & Flulaval for VFC) >6mos 11/03/2021, 11/17/2023    Influenza Split Preservative Free ID 10/26/2017, 10/04/2018, 10/07/2019,  "10/15/2020    Shingrix 03/09/2021, 05/21/2021    Tdap 04/27/2023        Family History:  Family History   Problem Relation Age of Onset    High cholesterol Mother     Heart disease Mother     Colon cancer Father     Hypertension Father     High cholesterol Father     Hypertension Brother     Heart attack Maternal Grandfather     Breast cancer Paternal Grandmother     Colon cancer Paternal Grandfather        Social History:  Social History     Socioeconomic History    Marital status: Single   Tobacco Use    Smoking status: Never    Smokeless tobacco: Never   Vaping Use    Vaping status: Never Used   Substance and Sexual Activity    Alcohol use: Yes     Alcohol/week: 2.0 standard drinks of alcohol     Types: 2 Glasses of wine per week    Drug use: Never    Sexual activity: Not Currently     Partners: Male     Birth control/protection: Hysterectomy       Objective   /88   Pulse 90   Temp 97.8 °F (36.6 °C) (Infrared)   Ht 174 cm (68.5\")   Wt 84.8 kg (187 lb)   SpO2 97%   BMI 28.02 kg/m²             Physical Exam  Vitals reviewed.   Constitutional:       General: She is not in acute distress.  HENT:      Right Ear: Tympanic membrane, ear canal and external ear normal.      Left Ear: Tympanic membrane, ear canal and external ear normal.      Nose: Nose normal.      Mouth/Throat:      Mouth: Mucous membranes are moist.      Pharynx: No oropharyngeal exudate or posterior oropharyngeal erythema.   Eyes:      Conjunctiva/sclera: Conjunctivae normal.   Cardiovascular:      Rate and Rhythm: Normal rate and regular rhythm.      Heart sounds: Normal heart sounds. No murmur heard.  Pulmonary:      Effort: Pulmonary effort is normal. No respiratory distress.      Breath sounds: Normal breath sounds.   Abdominal:      General: Abdomen is flat. Bowel sounds are normal. There is no distension.      Palpations: Abdomen is soft.      Tenderness: There is no abdominal tenderness.   Skin:     General: Skin is warm and dry. "   Neurological:      Mental Status: She is alert.         Assessment & Plan   1. Cough, unspecified type  2. Mediastinal Mass due to Hodgkin's Lymphoma  3. Allergic Rhinitis  - Reviewed bronch 2024, CTA and PET 2024, patient had known compression on pulm artery and bronchi from mass on original scans that are now a month out or more. Discussed that cough and periodic increased SOA could be related to compression and irritation of airway from mass  - pt also underwent intubated OR procedure for LN biopsy a week ago, discussed no fever, chills, does have some light yellow color sputum, will obtain CXR. COVID and Flu testing negative today  - pt feels allergic sx are at baseline  - pt without history of clots, no DVT sx, no tachycardia, pleuritic pain, does not feels that the breathlessness is continual, more associated with coughing spells, a lot of exertion or laying down  - will give Albuterol to help open airway  - discussed that if develops DVT sx, tachycardia, fever, chills, pleuritic pain, progressive SOA that she needs to go to ED to r/o clot, insure that there is not significant compression that has been progressive from mass etc  - POCT SARS-CoV-2 Antigen VAISHALI + Flu - negative in office today  - XR Chest PA & Lateral; Future   - albuterol sulfate  (90 Base) MCG/ACT inhaler; 2-4 puffs inhaled q4-6hr PRN SOA, cough, wheeze  Dispense: 18 g; Refill: 1  - Will touch base on Monday    Hodgkin's Lymphoma  - 5/2024: CTA  1. Large right hilar and mediastinal mass encasing the pulmonary arteries and bronchi and narrowing the lobar pulmonary arteries and bronchi. Findings are concerning with primary malignancy, lymphoma would be less favored.  2. Enlarged left axillary node concerning for metastatic disease  3. Two 6 mm right middle lobe nodules along the minor fissure concerning for metastatic disease.    6/2024 Bronch:  - Mediastinal mass - Hilar lymphadenopathy of the right side - Mediastinal adenopathy - The  airway examination of the left lung was normal. - Extrinsic compression was found in the bronchus intermedius secondary to a mass. - Bronchoalveolar lavage was performed. - A transbronchial needle aspiration was performed.    -7/2024 PET  1. Strongly hypermetabolic diffuse mediastinal lymphadenopathy, and small but hypermetabolic right and left lower cervical lymph nodes, and solitary hypermetabolic left axillary node, overall pattern suggestive of lymphoma.  2. No evidence of hypermetabolic disease in the abdomen or pelvis, or hypermetabolic marrow space disease.    8/2024 Excisional Lymph Node Biopsy  LYMPH NODE, AXILLARY, EXCISIONAL BIOPSY: Classic Hodgkin lymphoma, nodular sclerosis subtype.    Appt pending with Dr. Mistry at     Screening for colon cancer  - due to work up for Hodgkins pt is requesting referral to be placed for screening colonoscopy so can complete prior to end of year for financial reasons  - Ambulatory Referral For Screening Colonoscopy    Anxiety and Reactive Depression  - pt had recently moved to [place], , dx with Hodgkins and unexpected passing of Dad  - doing ok  - wanted recs for some counseling options so that she could check on coverage of providers with her insurance  - feels like when things slow down and starts treatment that she may need someone to talk to  - continue Venlafaxine    Hormone replacement therapy (HRT)  - currently on Progesterone and Estrogen  - underwent hysterectomy for endometriosis prior to 51  - Ambulatory Referral to Gynecology for HRT  - discussed that as she approaches 60yo that she should discuss with GYN risks and benefits of continuation of HRT  - Referral to [facility]    FU 12/2024 as scheduled, will touch base on Monday and FU PRN based on above sx    Liana Alfonso MD, FAAP, FACP  Internal Medicine and Pediatrics  [facility]

## 2024-08-21 ENCOUNTER — TELEPHONE (OUTPATIENT)
Age: 59
End: 2024-08-21
Payer: COMMERCIAL

## 2024-08-21 NOTE — TELEPHONE ENCOUNTER
PATIENT STATED THAT SHE SPOKE WITH ONCOLOGIST. ONCOLOGIST WAS NOT SURPRISED ABOUT COUGH DUE TO SIZE OF LYMPH NODES. 40% OF LYMPH NODES IN CHEST ARE ENLARGED.     ONCOLOGIST SAID THAT HE AGREES THAT ANTIBIOTICS WILL NOT CAUSE HARM WITH HER STARTING CHEMO NEXT MONTH.

## 2024-08-21 NOTE — TELEPHONE ENCOUNTER
Caller: Debbie Morris    Relationship: Self    Best call back number: [phone]     What is the best time to reach you: ANYTIME    Who are you requesting to speak with (clinical staff, provider,  specific staff member): PCP/MA    Do you know the name of the person who called: DEBBIE    What was the call regarding: PATIENT WANTS CALLBACK REGARDING COUGH    Is it okay if the provider responds through MyChart: CALLBACK TODAY

## 2024-08-23 ENCOUNTER — PATIENT MESSAGE (OUTPATIENT)
Age: 59
End: 2024-08-23
Payer: COMMERCIAL

## 2024-08-23 RX ORDER — LEVOFLOXACIN 750 MG/1
750 TABLET, FILM COATED ORAL DAILY
Qty: 7 TABLET | Refills: 0 | Status: SHIPPED | OUTPATIENT
Start: 2024-08-23 | End: 2024-08-30

## 2024-08-23 NOTE — TELEPHONE ENCOUNTER
Left vm for patient that I sent Levaquin to [facility]. If sputum and cough do not improve post abx to let me know or if develops soa, pleuritic pain or any other concerning sx.

## 2024-08-23 NOTE — TELEPHONE ENCOUNTER
Left vm for patient that I sent Levaquin to [facility]. If sputum and cough do not improve post abx to let me know or if develops soa, pleuritic pain or any other concerning sx.

## 2024-09-04 ENCOUNTER — PATIENT MESSAGE (OUTPATIENT)
Age: 59
End: 2024-09-04
Payer: COMMERCIAL

## 2024-09-05 ENCOUNTER — TELEPHONE (OUTPATIENT)
Dept: GASTROENTEROLOGY | Facility: CLINIC | Age: 59
End: 2024-09-05

## 2024-09-05 NOTE — TELEPHONE ENCOUNTER
Caller: Debbie Morris    Relationship to patient: Self    Best call back number: [phone]    Patient is needing: PT NEEDS TO CANCEL HER COLONOSCOPY ON 10/23/24. PT IS STARTING CHEMO AND HER DR DOESN'T WANT HER TO HAVE THE COLONOSCOPY UNTIL SHE'S DONE. SHE WILL CALL BACK TO RESCHEDULE WHEN SHE CAN.

## 2024-09-13 LAB
CYTO UR: NORMAL
LAB AP CASE REPORT: NORMAL
LAB AP CLINICAL INFORMATION: NORMAL
LAB AP DIAGNOSIS COMMENT: NORMAL
LAB AP FLOW CYTOMETRY SUMMARY: NORMAL
LAB AP OUTSIDE REPORT, ADDENDUM: NORMAL
PATH REPORT.FINAL DX SPEC: NORMAL
PATH REPORT.GROSS SPEC: NORMAL

## 2024-09-27 LAB
CYTO UR: NORMAL
LAB AP CASE REPORT: NORMAL
LAB AP CLINICAL INFORMATION: NORMAL
LAB AP DIAGNOSIS COMMENT: NORMAL
LAB AP FLOW CYTOMETRY SUMMARY: NORMAL
LAB AP OUTSIDE REPORT, ADDENDUM 2: NORMAL
LAB AP OUTSIDE REPORT, ADDENDUM: NORMAL
PATH REPORT.FINAL DX SPEC: NORMAL
PATH REPORT.GROSS SPEC: NORMAL

## 2024-10-03 ENCOUNTER — HOSPITAL ENCOUNTER (OUTPATIENT)
Dept: BONE DENSITY | Facility: HOSPITAL | Age: 59
Discharge: HOME | End: 2024-10-03
Admitting: INTERNAL MEDICINE
Payer: COMMERCIAL

## 2024-10-03 DIAGNOSIS — M85.80 OSTEOPENIA, UNSPECIFIED LOCATION: ICD-10-CM

## 2024-10-03 PROCEDURE — 77080 DXA BONE DENSITY AXIAL: CPT

## 2024-12-13 ENCOUNTER — OFFICE VISIT (OUTPATIENT)
Age: 59
End: 2024-12-13
Payer: COMMERCIAL

## 2024-12-13 VITALS
SYSTOLIC BLOOD PRESSURE: 120 MMHG | WEIGHT: 188 LBS | OXYGEN SATURATION: 99 % | HEART RATE: 84 BPM | HEIGHT: 69 IN | DIASTOLIC BLOOD PRESSURE: 82 MMHG | BODY MASS INDEX: 27.85 KG/M2

## 2024-12-13 DIAGNOSIS — F32.9 REACTIVE DEPRESSION: ICD-10-CM

## 2024-12-13 DIAGNOSIS — C44.92 SQUAMOUS CELL SKIN CANCER: ICD-10-CM

## 2024-12-13 DIAGNOSIS — F41.9 ANXIETY: ICD-10-CM

## 2024-12-13 DIAGNOSIS — E78.00 ELEVATED LDL CHOLESTEROL LEVEL: ICD-10-CM

## 2024-12-13 DIAGNOSIS — E55.9 VITAMIN D DEFICIENCY: ICD-10-CM

## 2024-12-13 DIAGNOSIS — C81.18 NODULAR SCLEROSIS HODGKIN LYMPHOMA OF LYMPH NODES OF MULTIPLE REGIONS: ICD-10-CM

## 2024-12-13 DIAGNOSIS — I10 ESSENTIAL HYPERTENSION: ICD-10-CM

## 2024-12-13 DIAGNOSIS — N80.9 ENDOMETRIOSIS: ICD-10-CM

## 2024-12-13 DIAGNOSIS — Z79.890 POSTMENOPAUSAL HRT (HORMONE REPLACEMENT THERAPY): ICD-10-CM

## 2024-12-13 DIAGNOSIS — I71.21 ASCENDING AORTIC ANEURYSM, UNSPECIFIED WHETHER RUPTURED: Primary | ICD-10-CM

## 2024-12-13 PROCEDURE — 99214 OFFICE O/P EST MOD 30 MIN: CPT | Performed by: INTERNAL MEDICINE

## 2024-12-13 RX ORDER — DEXAMETHASONE 4 MG/1
8 TABLET ORAL DAILY
COMMUNITY
Start: 2024-09-16

## 2024-12-13 RX ORDER — DICYCLOMINE HYDROCHLORIDE 10 MG/1
10 CAPSULE ORAL
COMMUNITY
Start: 2024-11-12

## 2024-12-13 NOTE — PROGRESS NOTES
Progress Note    Subjective      Debbie is a 59 y.o. female.    Chief Complaint   Patient presents with    Anxiety    Depression    Cancer    Skin Cancer     Spot on skin, they took out some but not the full margin, but will in the spring        HPI    HTN  - Amlodipine 5mg and Losartan 50mg  - had an ACE cough and then placed on Losartan     Dilated Aorta  - not sure which segment  - watching for 3yrs and not changed  - [facility] following annually  - seen by Cards and CTA 2024 and then found mediastinal mass     HPL  - Atorvastatin     Depression  - Wellbutrin 150XL  - Venlafaxine  - this past year divorce, moved, new job, cancer   - seeing therapist     Vit D Def  - continues on vitamin d     Weight Loss  - up to 200lb  - used Liraglutide for weight loss  - down to 168 and now 188     HRT  - on progesterone and estradiol  - no children  - heavy periods  - endometriosis wrapped around one ovary and getting ready to burst  - followed with GYN  - endometriosis left on bladder, tiny spots  - completed ablation and tubal ligation  - started having severe pain  - then underwent menopause after second surgery for endometriosis  - late 40s  - Wendy Jones MD at [facility]    Hodgkin's Lymphoma  Treatment has been going well  Had resp infection that delayed one round of treatment and required Azith  No mouth sores  Cough has gone  No fevers  Port is going well  Some loose stool  No vomiting    Past Medical History:  Patient Active Problem List   Diagnosis    Ascending aortic aneurysm    Anxiety    Aortic ectasia, thoracic    Carpal tunnel syndrome    Colon polyps    COVID-19    Elevated serum creatinine    Endometriosis    Elevated LDL cholesterol level    Essential hypertension    Hyperlipidemia    Hypervitaminosis B6    Migraine    Neuroma, Pitts's    Osteopenia    Postmenopausal HRT (hormone replacement therapy)    Reactive depression    Slow transit constipation    Squamous cell skin cancer    Tinnitus  of both ears    Urinary incontinence, urge    Vitamin D deficiency    Mediastinal mass    Hilar mass    Mediastinal adenopathy       Medications:  Current Outpatient Medications on File Prior to Visit   Medication Sig Dispense Refill    albuterol sulfate  (90 Base) MCG/ACT inhaler 2-4 puffs inhaled q4-6hr PRN SOA, cough, wheeze 18 g 1    amLODIPine (NORVASC) 5 MG tablet Take 1 tablet by mouth Daily.      Barberry-Oreg Grape-Goldenseal (BERBERINE COMPLEX PO) Take 1 each by mouth Daily.      buPROPion XL (WELLBUTRIN XL) 150 MG 24 hr tablet Take 1 tablet by mouth Every Morning. 90 tablet 1    dexAMETHasone (DECADRON) 4 MG tablet Take 2 tablets by mouth Daily.      dicyclomine (BENTYL) 10 MG capsule Take 1 capsule by mouth 4 (Four) Times a Day Before Meals & at Bedtime.      estradiol (CLIMARA) 0.05 MG/24HR patch Place 1 patch on the skin as directed by provider 2 (Two) Times a Week.      losartan (COZAAR) 50 MG tablet Take 1 tablet by mouth 2 (Two) Times a Day.      multivitamin with minerals (MULTIVITAMIN WOMENS 50+ ADV PO) Take 1 tablet by mouth Daily.      Progesterone (PROMETRIUM) 100 MG capsule Take 1 capsule by mouth Daily.      venlafaxine XR (EFFEXOR-XR) 75 MG 24 hr capsule Take 1 capsule by mouth Daily. 90 capsule 1    atorvastatin (LIPITOR) 10 MG tablet Take 1 tablet by mouth Daily. (Patient not taking: Reported on 12/13/2024)      [DISCONTINUED] traMADol (ULTRAM) 50 MG tablet Take 1 tablet by mouth Every 6 (Six) Hours As Needed for Moderate Pain. 10 tablet 0     No current facility-administered medications on file prior to visit.       Allergies:   Allergies   Allergen Reactions    Phentermine Anaphylaxis     Other Reaction(s): Anaphylaxis-PolymyxinB, very hyper , heart racing    Wound Dressing Adhesive Hives     Other Reaction(s): hives    Adhesive Tape Rash     Patient developed contact dermatitis from the surgical glue used for her laparoscopic cholecystectomy    Lisinopril Cough    Tape Rash      "Patient developed contact dermatitis from the surgical glue used for her laparoscopic cholecystectomy       Immunizations:  Immunization History   Administered Date(s) Administered    31-influenza Vac Quardvalent Preservativ 11/04/2016, 10/13/2020    COVID-19 (MODERNA) 1st,2nd,3rd Dose Monovalent 04/27/2021    Fluzone  >6mos 10/01/2019, 10/01/2020    Fluzone (or Fluarix & Flulaval for VFC) >6mos 11/03/2021, 11/17/2023    Influenza Split Preservative Free ID 10/26/2017, 10/04/2018, 10/07/2019, 10/15/2020    Shingrix 03/09/2021, 05/21/2021    Tdap 04/27/2023        Family History:  Family History   Problem Relation Age of Onset    High cholesterol Mother     Heart disease Mother     Colon cancer Father     Hypertension Father     High cholesterol Father     Hypertension Brother     Heart attack Maternal Grandfather     Breast cancer Paternal Grandmother     Colon cancer Paternal Grandfather        Social History:  Social History     Socioeconomic History    Marital status: Single   Tobacco Use    Smoking status: Never    Smokeless tobacco: Never   Vaping Use    Vaping status: Never Used   Substance and Sexual Activity    Alcohol use: Yes     Alcohol/week: 2.0 standard drinks of alcohol     Types: 2 Glasses of wine per week    Drug use: Never    Sexual activity: Not Currently     Partners: Male     Birth control/protection: Hysterectomy       Objective   /82   Pulse 84   Ht 174 cm (68.5\")   Wt 85.3 kg (188 lb)   SpO2 99%   BMI 28.17 kg/m²             Physical Exam  Vitals reviewed.   Constitutional:       General: She is not in acute distress.  HENT:      Nose: Nose normal.      Mouth/Throat:      Mouth: Mucous membranes are moist.   Eyes:      Conjunctiva/sclera: Conjunctivae normal.   Cardiovascular:      Rate and Rhythm: Normal rate and regular rhythm.      Heart sounds: Normal heart sounds. No murmur heard.  Pulmonary:      Effort: Pulmonary effort is normal. No respiratory distress.      Breath sounds: " Normal breath sounds.   Abdominal:      General: Abdomen is flat. Bowel sounds are normal. There is no distension.      Palpations: Abdomen is soft.      Tenderness: There is no abdominal tenderness.   Neurological:      Mental Status: She is alert.   Psychiatric:         Mood and Affect: Mood normal.         Thought Content: Thought content normal.         Judgment: Judgment normal.         Assessment & Plan     Hodgkin's Lymphoma  - 5/2024: CTA  1. Large right hilar and mediastinal mass encasing the pulmonary arteries and bronchi and narrowing the lobar pulmonary arteries and bronchi. Findings are concerning with primary malignancy, lymphoma would be less favored.  2. Enlarged left axillary node concerning for metastatic disease  3. Two 6 mm right middle lobe nodules along the minor fissure concerning for metastatic disease.     6/2024 Bronch:  - Mediastinal mass - Hilar lymphadenopathy of the right side - Mediastinal adenopathy - The airway examination of the left lung was normal. - Extrinsic compression was found in the bronchus intermedius secondary to a mass. - Bronchoalveolar lavage was performed. - A transbronchial needle aspiration was performed.     -7/2024 PET  1. Strongly hypermetabolic diffuse mediastinal lymphadenopathy, and small but hypermetabolic right and left lower cervical lymph nodes, and solitary hypermetabolic left axillary node, overall pattern suggestive of lymphoma.  2. No evidence of hypermetabolic disease in the abdomen or pelvis, or hypermetabolic marrow space disease.     8/2024 Excisional Lymph Node Biopsy  LYMPH NODE, AXILLARY, EXCISIONAL BIOPSY: Classic Hodgkin lymphoma, nodular sclerosis subtype.     Following with Dr. Mistry at   -completed the 2nd cycle of ABVD therapy and for the most part tolerated the treatment  - Interim PET scan imaging demonstrated significant response to treatment  - has  4 additional cycles of AVD (discontinue bleomycin)  - Per Onc does not meet  criteria for XRT given anatomy of tumor burden and concern regarding proximity to thoracic aneurysm  - PET scan did demonstrate right apical lung disease of unclear etiology. Due to concern about possible infection treated with round of Azith      Squamous Cell Carcinoma Skin  - will plan to address following Hodgkin's treatment    Thoracic Aorta Aneurysm   - present since 2020  - Followed annually previously by [facility]  - Thoracic aortic aneurysm, maximum dimension of 4.3 centimeter at the mid ascending aorta by CTA Chest April 2021. Echocardiogram May 5, 2023 showed dilated aorta with maximum dimension of 3.8 cm at the mid to distal ascending aorta.   - 6/2024: The ascending aorta measures 4 cm in diameter. The aortic root is normal in caliber. The transverse and descending thoracic aorta are normal in caliber. The visualized abdominal vasculature is unremarkable. The visualized proximal great vessels are   normal.  - Follows with Dr. Patricia Baer  - Followed previously at [facility], re-establishing now that moved to [place]  - 2 new lesions located on bladder  - referred to [facility] previously     Hormone replacement therapy (HRT)  - currently on Progesterone and Estrogen  - underwent hysterectomy for endometriosis prior to 51  - Ambulatory Referral to Gynecology for HRT  - discussed that as she approaches 62yo that she should discuss with GYN risks and benefits of continuation of HRT     Vitamin D deficiency  - continue vitamin d  - Vitamin D,25-Hydroxy; 4/2024     Mixed hyperlipidemia  - continue Atorvastatin 10mg  - Lipid Panel; 4/2024     BMI 28,adult  - currently on chemo treatment  - previously on Liraglutide but due to shortage could not find  - highest weight 200lb, lowest 168lb and now creeping up     Moderate episode of recurrent major depressive disorder  Anxiety  - buPROPion XL (WELLBUTRIN XL) 150 MG 24 hr tablet; Take 1 tablet by mouth Every Morning.   - venlafaxine XR  (EFFEXOR-XR) 75 MG 24 hr capsule; Take 1 capsule by mouth Daily.    - following with therapist     HTN  - continue Amlodipine 5mg and Losartan 50mg    Screening for colon cancer  - due to work up for Hodgkins pt is requesting referral to be placed for screening colonoscopy so can complete prior to end of year for financial reasons  - Ambulatory Referral For Screening Colonoscopy     FU in May post treatment       Liana Alfonso MD, FAAP, FACP  Internal Medicine and Pediatrics  [facility] [place] [facility]

## 2024-12-14 PROBLEM — C81.18 NODULAR SCLEROSIS HODGKIN LYMPHOMA OF LYMPH NODES OF MULTIPLE REGIONS: Status: ACTIVE | Noted: 2024-12-14

## 2025-01-02 ENCOUNTER — OFFICE VISIT (OUTPATIENT)
Dept: OBSTETRICS AND GYNECOLOGY | Facility: CLINIC | Age: 60
End: 2025-01-02
Payer: COMMERCIAL

## 2025-01-02 VITALS
WEIGHT: 191 LBS | DIASTOLIC BLOOD PRESSURE: 88 MMHG | SYSTOLIC BLOOD PRESSURE: 118 MMHG | BODY MASS INDEX: 28.29 KG/M2 | HEIGHT: 69 IN

## 2025-01-02 DIAGNOSIS — Z01.419 WELL WOMAN EXAM WITH ROUTINE GYNECOLOGICAL EXAM: Primary | ICD-10-CM

## 2025-01-02 DIAGNOSIS — Z79.890 POSTMENOPAUSAL HRT (HORMONE REPLACEMENT THERAPY): ICD-10-CM

## 2025-01-02 RX ORDER — ESTRADIOL 0.05 MG/D
1 PATCH, EXTENDED RELEASE TRANSDERMAL 2 TIMES WEEKLY
Qty: 24 PATCH | Refills: 4 | Status: SHIPPED | OUTPATIENT
Start: 2025-01-02

## 2025-01-02 RX ORDER — ESTRADIOL 0.05 MG/D
PATCH, EXTENDED RELEASE TRANSDERMAL
COMMUNITY
Start: 2024-12-18 | End: 2025-01-02 | Stop reason: ALTCHOICE

## 2025-01-02 NOTE — PROGRESS NOTES
"             Chief Complaint   Patient presents with    [facility]       Subjective   HPI  Debbie Morris is a 59 y.o. female, , who presents to establish care.     Patient has had a supra cervical hysterectomy for endometriosis.  Partner Status: single. New Partners since last visit: no.  Desires STD Screening: no.    She currently is undergoing treatment for Hodgkin's Lymphoma.  She uses estradiol patches as her HRT and would like a refill- this was approved by her oncologist.  She has a mammogram scheduled for 2025.    Started chemotherapy infusions in 2024 and will be done in February.          Additional OB/GYN History   Current contraception: contraceptive methods: None  Desires to: do not start contraception  Last Pap :   Last Completed Pap Smear       This patient has no relevant Health Maintenance data.          History of abnormal Pap smear: yes - \"had cryo in my 20's\"  but normal since then  Last mammogram:   Last Completed Mammogram            Scheduled - MAMMOGRAM (Every 2 Years) Scheduled for 2023  MAMMO SCREENING DIGITAL TOMOSYNTHESIS BILATERAL W CAD    2022  MAMMO SCREENING DIGITAL TOMOSYNTHESIS BILATERAL W CAD    2022  MAMMO SCREENING DIGITAL TOMOSYNTHESIS BILATERAL W CAD    2021  MAMMO DIAGNOSTIC DIGITAL TOMOSYNTHESIS LEFT W CAD    2021  MAMMO SCREENING DIGITAL TOMOSYNTHESIS BILATERAL W CAD    Only the first 5 history entries have been loaded, but more history exists.                  Tobacco Usage?: No   OB History          0    Para   0    Term   0       0    AB   0    Living   0         SAB   0    IAB   0    Ectopic   0    Molar   0    Multiple   0    Live Births   0                Health Maintenance   Topic Date Due    MOST FORM  Never done    Annual Gynecologic Pelvic and Breast Exam  Never done    Pneumococcal Vaccine 0-64 (1 of 2 - PCV) Never done    COVID-19 Vaccine (2 - Moderna risk series) 2021 " "   HEPATITIS C SCREENING  Never done    BMI FOLLOWUP  04/22/2025    LIPID PANEL  04/29/2025    ANNUAL PHYSICAL  05/30/2025    MAMMOGRAM  11/13/2025    COLORECTAL CANCER SCREENING  11/10/2030    TDAP/TD VACCINES (2 - Td or Tdap) 04/27/2033    INFLUENZA VACCINE  Completed    ZOSTER VACCINE  Completed       The additional following portions of the patient's history were reviewed and updated as appropriate: allergies, current medications, past medical history, past social history, past surgical history, and problem list.    Review of Systems   Constitutional: Negative.    HENT: Negative.     Eyes: Negative.    Respiratory: Negative.     Cardiovascular: Negative.    Gastrointestinal: Negative.    Endocrine: Negative.    Genitourinary: Negative.    Musculoskeletal: Negative.    Skin: Negative.    Allergic/Immunologic: Negative.    Neurological: Negative.    Hematological: Negative.    Psychiatric/Behavioral: Negative.         I have reviewed and agree with the HPI, ROS, and historical information as entered above. Taina Montano, FLOR      Objective   /88   Ht 174 cm (68.5\")   Wt 86.6 kg (191 lb)   LMP  (LMP Unknown)   BMI 28.62 kg/m²     Physical Exam  Vitals and nursing note reviewed. Exam conducted with a chaperone present.   Constitutional:       General: She is not in acute distress.     Appearance: Normal appearance. She is well-developed. She is not ill-appearing.   Neck:      Thyroid: No thyroid mass or thyromegaly.   Pulmonary:      Effort: Pulmonary effort is normal. No respiratory distress or retractions.   Chest:      Chest wall: No mass.   Breasts:     Right: Normal. No mass, nipple discharge, skin change or tenderness.      Left: Normal. No mass, nipple discharge, skin change or tenderness.          Comments: Port present right chest  Abdominal:      General: There is no distension.      Palpations: Abdomen is soft. Abdomen is not rigid. There is no mass.      Tenderness: There is no abdominal " tenderness. There is no guarding or rebound.      Hernia: No hernia is present.   Genitourinary:     General: Normal vulva.      Exam position: Lithotomy position.      Labia:         Right: No rash, tenderness or lesion.         Left: No rash, tenderness or lesion.       Vagina: Normal. No vaginal discharge or lesions.      Cervix: Normal.      Uterus: Absent.       Rectum: Normal. No external hemorrhoid.      Comments: Constipation complicating exam, difficult to visualize cervix until bimanual exam done first  Musculoskeletal:      Cervical back: No muscular tenderness.   Skin:     General: Skin is warm and dry.   Neurological:      Mental Status: She is alert and oriented to person, place, and time.   Psychiatric:         Mood and Affect: Mood normal.         Behavior: Behavior normal.       Assessment & Plan     Assessment     Problem List Items Addressed This Visit       Postmenopausal HRT (hormone replacement therapy)    Relevant Medications    estradiol (MINIVELLE, VIVELLE-DOT) 0.05 MG/24HR patch     Other Visit Diagnoses       Well woman exam with routine gynecological exam    -  Primary    Relevant Orders    LIQUID-BASED PAP SMEAR WITH HPV GENOTYPING REGARDLESS OF INTERPRETATION ([facility])            Plan     Pap obtained.   Reviewed monthly self breast exams.  Instructed to call with lumps, pain, or breast discharge.  Yearly mammograms ordered.  Reviewed risks of ERT including increased risk of breast cancer, increased blood clots, increased heart disease. Patient strongly desires to stay on or start ERT. She understands we will use the lowest amount that adequately controls her symptoms. She does not have to take progesterone due to supracervical hysterectomy. Rx sent.   Return in about 1 year (around 1/2/2026) for Annual physical.        Taina Montano, FLOR  01/02/2025

## 2025-01-06 ENCOUNTER — HOSPITAL ENCOUNTER (OUTPATIENT)
Dept: MAMMOGRAPHY | Facility: HOSPITAL | Age: 60
Discharge: HOME | End: 2025-01-06
Payer: COMMERCIAL

## 2025-01-07 LAB — REF LAB TEST METHOD: NORMAL

## 2025-01-14 ENCOUNTER — HOSPITAL ENCOUNTER (OUTPATIENT)
Dept: MAMMOGRAPHY | Facility: HOSPITAL | Age: 60
Discharge: HOME | End: 2025-01-14
Admitting: OBSTETRICS & GYNECOLOGY
Payer: COMMERCIAL

## 2025-01-14 DIAGNOSIS — Z12.31 BREAST CANCER SCREENING BY MAMMOGRAM: ICD-10-CM

## 2025-01-14 PROCEDURE — 77063 BREAST TOMOSYNTHESIS BI: CPT

## 2025-01-14 PROCEDURE — 77067 SCR MAMMO BI INCL CAD: CPT

## 2025-01-29 ENCOUNTER — HOSPITAL ENCOUNTER (OUTPATIENT)
Facility: HOSPITAL | Age: 60
Discharge: HOME | End: 2025-01-29
Admitting: RADIOLOGY
Payer: COMMERCIAL

## 2025-01-29 DIAGNOSIS — R92.8 ABNORMAL MAMMOGRAM: ICD-10-CM

## 2025-01-29 PROCEDURE — 77061 BREAST TOMOSYNTHESIS UNI: CPT | Performed by: RADIOLOGY

## 2025-01-29 PROCEDURE — G0279 TOMOSYNTHESIS, MAMMO: HCPCS

## 2025-01-29 PROCEDURE — 77065 DX MAMMO INCL CAD UNI: CPT

## 2025-01-29 PROCEDURE — 77065 DX MAMMO INCL CAD UNI: CPT | Performed by: RADIOLOGY

## 2025-02-25 RX ORDER — LOSARTAN POTASSIUM 50 MG/1
50 TABLET ORAL 2 TIMES DAILY
Qty: 180 TABLET | Refills: 3 | Status: SHIPPED | OUTPATIENT
Start: 2025-02-25

## 2025-02-25 RX ORDER — AMLODIPINE BESYLATE 5 MG/1
5 TABLET ORAL DAILY
Qty: 90 TABLET | Refills: 3 | Status: SHIPPED | OUTPATIENT
Start: 2025-02-25

## 2025-02-27 ENCOUNTER — OFFICE VISIT (OUTPATIENT)
Age: 60
End: 2025-02-27
Payer: COMMERCIAL

## 2025-02-27 VITALS
SYSTOLIC BLOOD PRESSURE: 124 MMHG | HEIGHT: 69 IN | HEART RATE: 102 BPM | OXYGEN SATURATION: 98 % | TEMPERATURE: 99.4 F | WEIGHT: 201 LBS | BODY MASS INDEX: 29.77 KG/M2 | DIASTOLIC BLOOD PRESSURE: 84 MMHG

## 2025-02-27 DIAGNOSIS — J06.9 URI, ACUTE: Primary | ICD-10-CM

## 2025-02-27 DIAGNOSIS — J01.90 ACUTE BACTERIAL SINUSITIS: ICD-10-CM

## 2025-02-27 DIAGNOSIS — B96.89 ACUTE BACTERIAL SINUSITIS: ICD-10-CM

## 2025-02-27 LAB
EXPIRATION DATE: NORMAL
EXPIRATION DATE: NORMAL
FLUAV AG UPPER RESP QL IA.RAPID: NOT DETECTED
FLUBV AG UPPER RESP QL IA.RAPID: NOT DETECTED
INTERNAL CONTROL: NORMAL
INTERNAL CONTROL: NORMAL
Lab: NORMAL
Lab: NORMAL
S PYO AG THROAT QL: NEGATIVE
SARS-COV-2 AG UPPER RESP QL IA.RAPID: NOT DETECTED

## 2025-02-27 RX ORDER — AZITHROMYCIN 250 MG/1
TABLET, FILM COATED ORAL
Qty: 6 TABLET | Refills: 0 | Status: SHIPPED | OUTPATIENT
Start: 2025-02-27

## 2025-02-27 NOTE — PROGRESS NOTES
Follow Up Office Visit      Date: 2025   Patient Name: Debbie Morris  : 1965   MRN: [id number]     Chief Complaint:    Chief Complaint   Patient presents with    Sore Throat    Cough     Started to get worse yesterday       History of Present Illness: Debbie Morris is a 59 y.o. female with a PMH significant for hodgkin's lymphoma who is here today to discuss pharyngitis.   History of Present Illness    Patient reports that she completed the last course of AVD treatment on Monday and developed a sore throat later that night and Tuesday. She reports that later on Tuesday, a cough started and got worse on Wednesday. She has been coughing up some phlegm. Reports that on Tuesday contacted her hematologist who recommended evaluation by PCP if no fever was present and cough worsened.  She reports leila the highest temp has been 99.8 F. She states that she can tell that she has a cold and this is not chemo related as the symptoms are different. She reports some congestion that is yellow .         Subjective      Review of Systems:   Review of Systems    I have reviewed the patients family history, social history, past medical history, past surgical history and have updated it as appropriate.     Medications:     Current Outpatient Medications:     amLODIPine (NORVASC) 5 MG tablet, Take 1 tablet by mouth Daily., Disp: 90 tablet, Rfl: 3    Barberry-Oreg Grape-Goldenseal (BERBERINE COMPLEX PO), Take 1 each by mouth Daily., Disp: , Rfl:     buPROPion XL (WELLBUTRIN XL) 150 MG 24 hr tablet, Take 1 tablet by mouth Every Morning., Disp: 90 tablet, Rfl: 1    dexAMETHasone (DECADRON) 4 MG tablet, Take 2 tablets by mouth Daily., Disp: , Rfl:     dicyclomine (BENTYL) 10 MG capsule, Take 1 capsule by mouth 4 (Four) Times a Day Before Meals & at Bedtime., Disp: , Rfl:     estradiol (MINIVELLE, VIVELLE-DOT) 0.05 MG/24HR patch, Place 1 patch on the skin as directed by provider 2 (Two) Times a Week., Disp: 24 patch,  "Rfl: 4    losartan (COZAAR) 50 MG tablet, Take 1 tablet by mouth 2 (Two) Times a Day., Disp: 180 tablet, Rfl: 3    multivitamin with minerals (MULTIVITAMIN WOMENS 50+ ADV PO), Take 1 tablet by mouth Daily., Disp: , Rfl:     venlafaxine XR (EFFEXOR-XR) 75 MG 24 hr capsule, Take 1 capsule by mouth Daily., Disp: 90 capsule, Rfl: 1    albuterol sulfate  (90 Base) MCG/ACT inhaler, 2-4 puffs inhaled q4-6hr PRN SOA, cough, wheeze (Patient not taking: Reported on 2/27/2025), Disp: 18 g, Rfl: 1    azithromycin (Zithromax Z-Jose Antonio) 250 MG tablet, Take 2 tablets by mouth on day 1, then 1 tablet daily on days 2-5, Disp: 6 tablet, Rfl: 0    Allergies:   Allergies   Allergen Reactions    Phentermine Anaphylaxis     Other Reaction(s): Anaphylaxis-PolymyxinB, very hyper , heart racing    Wound Dressing Adhesive Hives     Other Reaction(s): hives    Adhesive Tape Rash     Patient developed contact dermatitis from the surgical glue used for her laparoscopic cholecystectomy    Lisinopril Cough    Tape Rash     Patient developed contact dermatitis from the surgical glue used for her laparoscopic cholecystectomy       Objective     Physical Exam: Please see above  Vital Signs:   Vitals:    02/27/25 1112   BP: 124/84   Pulse: 102   Temp: 99.4 °F (37.4 °C)   SpO2: 98%   Weight: 91.2 kg (201 lb)   Height: 174 cm (68.5\")     Body mass index is 30.11 kg/m².          Physical Exam  Constitutional:       Appearance: Normal appearance.   HENT:      Head: Normocephalic and atraumatic.      Nose: Congestion present. No rhinorrhea.      Mouth/Throat:      Pharynx: No oropharyngeal exudate or posterior oropharyngeal erythema.   Eyes:      General:         Right eye: No discharge.         Left eye: No discharge.      Extraocular Movements: Extraocular movements intact.      Pupils: Pupils are equal, round, and reactive to light.   Cardiovascular:      Rate and Rhythm: Normal rate and regular rhythm.      Heart sounds: No murmur heard.     No " friction rub. No gallop.   Pulmonary:      Effort: Pulmonary effort is normal.      Breath sounds: Normal breath sounds.   Abdominal:      General: Abdomen is flat. Bowel sounds are normal. There is no distension.      Palpations: Abdomen is soft.      Tenderness: There is no abdominal tenderness. There is no guarding or rebound.   Musculoskeletal:         General: Normal range of motion.      Cervical back: Normal range of motion.      Right lower leg: No edema.      Left lower leg: No edema.   Skin:     General: Skin is warm.      Capillary Refill: Capillary refill takes less than 2 seconds.      Findings: No rash.   Neurological:      General: No focal deficit present.      Mental Status: She is alert.   Psychiatric:         Mood and Affect: Mood normal.         Procedures    Results:   Results      Labs:   Hemoglobin A1C   Date Value Ref Range Status   04/29/2024 5.90 (H) 4.80 - 5.60 % Final     TSH   Date Value Ref Range Status   04/29/2024 2.690 0.270 - 4.200 uIU/mL Final        Imaging:   No valid procedures specified.     Assessment / Plan      Assessment/Plan:       Assessment & Plan    Hodgkin's lymphoma   Last ANC 0.28   Completed last chemotherapy on Monday ( AVD treatment)         URI    Acute bacterial sinusitis   -   Denies febrile episodes, but reports a temperature of 100F   -   congestion and rhinorrhea present   -   denies lethargy or significantly impaired PO intake   -   physical exam not concerning for respiratory compromise and baseline interactivity    Plan:   -   COVID/Influenza/strep negative   -   return precautions given prior to discharge   -   counseled to maintain oral intake   -Case was discussed with hematology physician who is following patient for chemotherapy for Hodgkin's lymphoma.  This patient has been on ill-appearing and has symptoms consistent with a URI we will treat with a Z-Jose Antonio.  Emergency department precautions were discussed with patient including a temperature of 104  Fahrenheit more than 2 times in a 24-hour.  Or a temperature of 101.  Additionally patient is aware that if she starts feeling more ill she also needs to get evaluated in the emergency department for another source of infection.      Follow Up:   No follow-ups on file.    I spent 40 minutes caring for Debbie on this date of service. This time includes time spent by me in the following activities: preparing for the visit, reviewing tests, performing a medically appropriate examination and/or evaluation, counseling and educating the patient/family/caregiver, referring and communicating with other health care professionals, documenting information in the medical record, care coordination, and ordering medications.         Nathan Lund  [facility] [place]

## 2025-03-06 DIAGNOSIS — F33.1 MODERATE EPISODE OF RECURRENT MAJOR DEPRESSIVE DISORDER: ICD-10-CM

## 2025-03-06 RX ORDER — VENLAFAXINE HYDROCHLORIDE 75 MG/1
75 CAPSULE, EXTENDED RELEASE ORAL DAILY
Qty: 90 CAPSULE | Refills: 1 | Status: SHIPPED | OUTPATIENT
Start: 2025-03-06

## 2025-03-06 NOTE — TELEPHONE ENCOUNTER
Rx Refill Note  Requested Prescriptions     Pending Prescriptions Disp Refills    venlafaxine XR (EFFEXOR-XR) 75 MG 24 hr capsule 90 capsule 1     Sig: Take 1 capsule by mouth Daily.      Last office visit with prescribing clinician: 12/13/2024   Last telemedicine visit with prescribing clinician: Visit date not found   Next office visit with prescribing clinician: 5/6/2025     Marilou Moran MA  03/06/25, 11:14 EST    Rx sent

## 2025-04-08 ENCOUNTER — TELEPHONE (OUTPATIENT)
Age: 60
End: 2025-04-08
Payer: COMMERCIAL

## 2025-04-08 DIAGNOSIS — F33.1 MODERATE EPISODE OF RECURRENT MAJOR DEPRESSIVE DISORDER: ICD-10-CM

## 2025-04-08 RX ORDER — BUPROPION HYDROCHLORIDE 150 MG/1
150 TABLET ORAL EVERY MORNING
Qty: 90 TABLET | Refills: 1 | Status: SHIPPED | OUTPATIENT
Start: 2025-04-08

## 2025-04-08 NOTE — TELEPHONE ENCOUNTER
[facility] request Bupropion hcl xl 150 mg tab 90 day supply to be sent in

## 2025-05-08 ENCOUNTER — OFFICE VISIT (OUTPATIENT)
Age: 60
End: 2025-05-08
Payer: COMMERCIAL

## 2025-05-08 ENCOUNTER — LAB (OUTPATIENT)
Age: 60
End: 2025-05-08
Payer: COMMERCIAL

## 2025-05-08 VITALS
SYSTOLIC BLOOD PRESSURE: 116 MMHG | BODY MASS INDEX: 30.33 KG/M2 | DIASTOLIC BLOOD PRESSURE: 78 MMHG | HEIGHT: 68 IN | WEIGHT: 200.1 LBS

## 2025-05-08 DIAGNOSIS — C44.92 SQUAMOUS CELL SKIN CANCER: ICD-10-CM

## 2025-05-08 DIAGNOSIS — R73.9 HYPERGLYCEMIA: ICD-10-CM

## 2025-05-08 DIAGNOSIS — I10 ESSENTIAL HYPERTENSION: ICD-10-CM

## 2025-05-08 DIAGNOSIS — F32.9 REACTIVE DEPRESSION: ICD-10-CM

## 2025-05-08 DIAGNOSIS — I71.21 ASCENDING AORTIC ANEURYSM, UNSPECIFIED WHETHER RUPTURED: ICD-10-CM

## 2025-05-08 DIAGNOSIS — K63.5 POLYP OF COLON, UNSPECIFIED PART OF COLON, UNSPECIFIED TYPE: ICD-10-CM

## 2025-05-08 DIAGNOSIS — E55.9 VITAMIN D DEFICIENCY: ICD-10-CM

## 2025-05-08 DIAGNOSIS — F41.9 ANXIETY: ICD-10-CM

## 2025-05-08 DIAGNOSIS — E78.5 HYPERLIPIDEMIA, UNSPECIFIED HYPERLIPIDEMIA TYPE: ICD-10-CM

## 2025-05-08 DIAGNOSIS — C81.18 NODULAR SCLEROSIS HODGKIN LYMPHOMA OF LYMPH NODES OF MULTIPLE REGIONS: ICD-10-CM

## 2025-05-08 DIAGNOSIS — E78.5 HYPERLIPIDEMIA, UNSPECIFIED HYPERLIPIDEMIA TYPE: Primary | ICD-10-CM

## 2025-05-08 DIAGNOSIS — Z79.890 POSTMENOPAUSAL HRT (HORMONE REPLACEMENT THERAPY): ICD-10-CM

## 2025-05-08 DIAGNOSIS — D84.9 IMMUNOSUPPRESSION: ICD-10-CM

## 2025-05-08 LAB
DEPRECATED RDW RBC AUTO: 46.5 FL (ref 37–54)
ERYTHROCYTE [DISTWIDTH] IN BLOOD BY AUTOMATED COUNT: 13.1 % (ref 12.3–15.4)
HBA1C MFR BLD: 5.4 % (ref 4.8–5.6)
HCT VFR BLD AUTO: 45.2 % (ref 34–46.6)
HGB BLD-MCNC: 14.9 G/DL (ref 12–15.9)
MCH RBC QN AUTO: 32.3 PG (ref 26.6–33)
MCHC RBC AUTO-ENTMCNC: 33 G/DL (ref 31.5–35.7)
MCV RBC AUTO: 98 FL (ref 79–97)
PLATELET # BLD AUTO: 287 10*3/MM3 (ref 140–450)
PMV BLD AUTO: 10.5 FL (ref 6–12)
RBC # BLD AUTO: 4.61 10*6/MM3 (ref 3.77–5.28)
WBC NRBC COR # BLD AUTO: 5.44 10*3/MM3 (ref 3.4–10.8)

## 2025-05-08 PROCEDURE — 80053 COMPREHEN METABOLIC PANEL: CPT | Performed by: INTERNAL MEDICINE

## 2025-05-08 PROCEDURE — 80061 LIPID PANEL: CPT | Performed by: INTERNAL MEDICINE

## 2025-05-08 PROCEDURE — 83036 HEMOGLOBIN GLYCOSYLATED A1C: CPT | Performed by: INTERNAL MEDICINE

## 2025-05-08 PROCEDURE — 85007 BL SMEAR W/DIFF WBC COUNT: CPT | Performed by: INTERNAL MEDICINE

## 2025-05-08 PROCEDURE — 36415 COLL VENOUS BLD VENIPUNCTURE: CPT | Performed by: INTERNAL MEDICINE

## 2025-05-08 PROCEDURE — 99214 OFFICE O/P EST MOD 30 MIN: CPT | Performed by: INTERNAL MEDICINE

## 2025-05-08 PROCEDURE — 82306 VITAMIN D 25 HYDROXY: CPT | Performed by: INTERNAL MEDICINE

## 2025-05-08 PROCEDURE — 85027 COMPLETE CBC AUTOMATED: CPT | Performed by: INTERNAL MEDICINE

## 2025-05-08 RX ORDER — POLYETHYLENE GLYCOL 3350 17 G/17G
17 POWDER, FOR SOLUTION ORAL DAILY
COMMUNITY

## 2025-05-08 NOTE — PROGRESS NOTES
Progress Note    Cirilo Lewis is a 59 y.o. female.    Chief Complaint   Patient presents with    Primary Care Follow-Up     2 months since Chemo.  Would like to taper the depression medications.  Would like to discuss weight gain.       Primary Care Follow-Up  Here for follow up. Done with chemo and in remission now! In good spirits and would like to return to work in person (currently working from home) once immune system back to normal. Requesting labs and wellness paper work today      HTN  - continues on Amlodipine 5mg and Losartan 50mg  - had an ACE cough and then placed on Losartan     Dilated Aorta  - not sure which segment  - watching for 3yrs and not changed  - [facility] following annually  - seen by Cards and CTA 2024 and then found mediastinal mass  - has cardiology appt upcoming      HPL  - previously on atorvastatin, stopped with chemotherapy  - has cardiology appt upcoming        Depression  - Wellbutrin 150XL  - Venlafaxine  - continues with therapist  - would like to taper off medications. Notes mainly helped with divorce stressor and in good spirits now     Vit D Def  - continues on vitamin d     Weight Loss  -up to 200lb  -previously on Liraglutide for weight loss  -notes last two rounds of chemo were most difficult for energy and appetite  -wanting to get back to work, out and about and would like to lose back weight again     HRT  Previously on progesterone and estradiol; progesterone stopped with chemo  Previously followed with [facility] Dr Jones and trying to establish with closer to home now. Appt in 1/2026 with  ob/gyn    - no children  - heavy periods  - endometriosis wrapped around one ovary and getting ready to burst  - followed with GYN  - endometriosis left on bladder, tiny spots  - completed ablation and tubal ligation  - started having severe pain  - then underwent menopause after second surgery for endometriosis  - late 40s  - Wendy Jones MD at [facility]  - Did see [facility] but wanted to switch to a new provider at [facility]    Hodgkin's Lymphoma  Done with chemo and now in remission. Had port removed and healing well.     Past Medical History:  Patient Active Problem List   Diagnosis    Ascending aortic aneurysm    Anxiety    Aortic ectasia, thoracic    Carpal tunnel syndrome    Colon polyps    COVID-19    Elevated serum creatinine    Endometriosis    Elevated LDL cholesterol level    Essential hypertension    Hyperlipidemia    Hypervitaminosis B6    Migraine    Neuroma, Pitts's    Osteopenia    Postmenopausal HRT (hormone replacement therapy)    Reactive depression    Slow transit constipation    Squamous cell skin cancer    Tinnitus of both ears    Urinary incontinence, urge    Vitamin D deficiency    Mediastinal mass    Hilar mass    Mediastinal adenopathy    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 90 tablet 3    buPROPion XL (WELLBUTRIN XL) 150 MG 24 hr tablet Take 1 tablet by mouth Every Morning. 90 tablet 1    estradiol (MINIVELLE, VIVELLE-DOT) 0.05 MG/24HR patch Place 1 patch on the skin as directed by provider 2 (Two) Times a Week. 24 patch 4    losartan (COZAAR) 50 MG tablet Take 1 tablet by mouth 2 (Two) Times a Day. 180 tablet 3    multivitamin with minerals (MULTIVITAMIN WOMENS 50+ ADV PO) Take 1 tablet by mouth Daily.      polyethylene glycol (MIRALAX) 17 g packet Take 17 g by mouth Daily.      venlafaxine XR (EFFEXOR-XR) 75 MG 24 hr capsule Take 1 capsule by mouth Daily. 90 capsule 1    albuterol sulfate  (90 Base) MCG/ACT inhaler 2-4 puffs inhaled q4-6hr PRN SOA, cough, wheeze (Patient not taking: Reported on 1/2/2025) 18 g 1    azithromycin (Zithromax Z-Jose Antonio) 250 MG tablet Take 2 tablets by mouth on day 1, then 1 tablet daily on days  2-5 (Patient not taking: Reported on 5/8/2025) 6 tablet 0    Barberry-Oreg Grape-Goldenseal (BERBERINE COMPLEX PO) Take 1 each by mouth Daily. (Patient not taking: Reported on 5/8/2025)      dexAMETHasone (DECADRON) 4 MG tablet Take 2 tablets by mouth Daily. (Patient not taking: Reported on 5/8/2025)      dicyclomine (BENTYL) 10 MG capsule Take 1 capsule by mouth 4 (Four) Times a Day Before Meals & at Bedtime. (Patient not taking: Reported on 5/8/2025)       No current facility-administered medications on file prior to visit.       Allergies:   Allergies   Allergen Reactions    Phentermine Anaphylaxis     Other Reaction(s): Anaphylaxis-PolymyxinB, very hyper , heart racing    Wound Dressing Adhesive Hives     Other Reaction(s): hives    Adhesive Tape Rash     Patient developed contact dermatitis from the surgical glue used for her laparoscopic cholecystectomy    Lisinopril Cough    Tape Rash     Patient developed contact dermatitis from the surgical glue used for her laparoscopic cholecystectomy       Immunizations:  Immunization History   Administered Date(s) Administered    31-influenza Vac Quardvalent Preservativ 11/04/2016, 10/13/2020    COVID-19 (MODERNA) 1st,2nd,3rd Dose Monovalent 04/27/2021    Fluzone  >6mos 10/01/2019, 10/01/2020    Fluzone (or Fluarix & Flulaval for VFC) >6mos 11/03/2021, 11/17/2023    Influenza Split Preservative Free ID 10/26/2017, 10/04/2018, 10/07/2019, 10/15/2020    Shingrix 03/09/2021, 05/21/2021    Tdap 04/27/2023        Family History:  Family History   Problem Relation Age of Onset    High cholesterol Mother     Heart disease Mother     Colon cancer Father     Hypertension Father     High cholesterol Father     Hypertension Brother     Breast cancer Paternal Grandmother     Heart attack Maternal Grandfather     Colon cancer Paternal Grandfather     Ovarian cancer Neg Hx        Social History:  Social History     Socioeconomic History    Marital status: Single   Tobacco Use     "Smoking status: Never    Smokeless tobacco: Never   Vaping Use    Vaping status: Never Used   Substance and Sexual Activity    Alcohol use: Yes     Alcohol/week: 2.0 standard drinks of alcohol     Types: 2 Glasses of wine per week    Drug use: Never    Sexual activity: Not Currently     Partners: Male     Birth control/protection: Hysterectomy       Objective   /78 (BP Location: Right arm, Patient Position: Sitting, Cuff Size: Adult) Comment: Blood pressure needs to be taken in RIGHT arm only  Ht 172.7 cm (68\")   Wt 90.8 kg (200 lb 1.6 oz)   LMP  (LMP Unknown)   BMI 30.43 kg/m²             Physical Exam  Vitals reviewed.   Constitutional:       General: She is not in acute distress.  HENT:      Nose: Nose normal.      Mouth/Throat:      Mouth: Mucous membranes are moist.   Eyes:      Conjunctiva/sclera: Conjunctivae normal.   Cardiovascular:      Rate and Rhythm: Normal rate and regular rhythm.      Heart sounds: Normal heart sounds. No murmur heard.  Pulmonary:      Effort: Pulmonary effort is normal. No respiratory distress.      Breath sounds: Normal breath sounds.   Abdominal:      General: Abdomen is flat.   Skin:     Comments: Prior port site to right chest healing well, no erythema   Scar from prior skin excision noted to mid back   Neurological:      Mental Status: She is alert.   Psychiatric:         Mood and Affect: Mood normal.         Thought Content: Thought content normal.         Judgment: Judgment normal.         Assessment & Plan     Hodgkin's Lymphoma  - 5/2024: CTA  1. Large right hilar and mediastinal mass encasing the pulmonary arteries and bronchi and narrowing the lobar pulmonary arteries and bronchi. Findings are concerning with primary malignancy, lymphoma would be less favored.  2. Enlarged left axillary node concerning for metastatic disease  3. Two 6 mm right middle lobe nodules along the minor fissure concerning for metastatic disease.     6/2024 Bronch:  - Mediastinal mass - " Hilar lymphadenopathy of the right side - Mediastinal adenopathy - The airway examination of the left lung was normal. - Extrinsic compression was found in the bronchus intermedius secondary to a mass. - Bronchoalveolar lavage was performed. - A transbronchial needle aspiration was performed.     -7/2024 PET  1. Strongly hypermetabolic diffuse mediastinal lymphadenopathy, and small but hypermetabolic right and left lower cervical lymph nodes, and solitary hypermetabolic left axillary node, overall pattern suggestive of lymphoma.  2. No evidence of hypermetabolic disease in the abdomen or pelvis, or hypermetabolic marrow space disease.     8/2024 Excisional Lymph Node Biopsy  LYMPH NODE, AXILLARY, EXCISIONAL BIOPSY: Classic Hodgkin lymphoma, nodular sclerosis subtype.    9/5/2024: ECHOCARDIOGRAM:  Left Ventricle: The left ventricle is small. There is normal left ventricular myocardial thickness and mass. The left ventricular systolic function is normal. The LVEF as measured by biplane volume is 61%. The global longitudinal strain is borderline (-16.5% to -18%). The diastolic function is normal. The left ventricular filling pressure is normal.  Right Ventricle: The right ventricle is normal in size. The right ventricular systolic function is normal.  Valves: No significant valvular stenosis or regurgitation.  Pericardium: No pericardial effusion.    9/13/2024: PFTs:  FVC: 105%, FEV1 84%; FEV1/FVC: 80%; AdjDLCO: 94.6%    Initiated ABVD: DOXOrubicin / Bleomycin / VinBLASTine / Dacarbazine:  C1D1: 9/16/2024  C2D1: 10/14/2024    11/7/2024: PET scan:   IMPRESSION:  -Complete Metabolic Response, Lugano score 2/3.  -Complete metabolic resolution of the intense FDG uptake of several mediastinal and hilar adenopathy with residual low-grade FDG uptake above the liver activity.  -Interval development of diffuse nodular, groundglass opacities with thickened interlobular septa localized to the right upper lobe which may reflect  infectious/inflammatory process and attention on follow-up imaging is recommended.  -No new hypermetabolic lymphadenopathy above or below the diaphragm.  -Hypermetabolic diffuse circumferential wall thickening of the cervical esophagus which reflects esophagitis, direct visualization and sampling as clinically relevant is recommended.    Continued ABVD/AVD:   C3D1: 11/11/2024  C4D1: 12/16/2024  C5D1: 1/13/2024  C6D1: 2/10/2025    3/31/2025: PET/CT scan:  IMPRESSION:  -Lugano 2: complete metabolic response  -Similar appearance of mediastinal/hilar adenopathy with continued decreasing FDG avidity.  -Resolution of hypermetabolic nodularity within the right upper lobe however there is some remaining groundglass opacities. Findings are may be infectious/inflammatory. Continued attention on follow-up.  -No new hypermetabolic lymphadenopathy above or below the diaphragm.  -Stable unchanged aortic aneurysm (4.4 cm)    -Following with Dr. Mistry at   - Plan for active surveillance at this time. She will RTC in 3 months and will have CT CAP as surveillance every 6 months for 2 years. Dr. Mistry explained to the patient to the patient that chance of recurrence reduces after two years.   - She had port removed.   - she is able to return to work with no restrictions  - encouraged to stay up to date on vaccines    Squamous Cell Carcinoma Skin  - has follow up appt with Derm     Thoracic Aorta Aneurysm   - present since 2020  - Followed annually previously by [facility]  - Thoracic aortic aneurysm, maximum dimension of 4.3 centimeter at the mid ascending aorta by CTA Chest April 2021. Echocardiogram May 5, 2023 showed dilated aorta with maximum dimension of 3.8 cm at the mid to distal ascending aorta.   - 6/2024: The ascending aorta measures 4 cm in diameter. The aortic root is normal in caliber. The transverse and descending thoracic aorta are normal in caliber. The visualized abdominal vasculature is unremarkable. The  visualized proximal great vessels are   normal.  - Follows with Dr. Gomez     Endometriosis  - Followed previously at [facility], re-establishing now that moved to [place]  - 2 new lesions located on bladder  - referred to [facility] previously, with next appt with [facility] 1/2026     Hormone replacement therapy (HRT)  - currently on Estrogen  - underwent hysterectomy for endometriosis prior to 51  - referred to Gyn for HRT  - previously discussed that as she approaches 60yo that she should discuss with GYN risks and benefits of continuation of HRT     Vitamin D deficiency  - continue vitamin d  - check vitamin D today on labs     Mixed hyperlipidemia  - now off statin (stopped with chemotherapy)  - check lipids today on labs     BMI 30, adult  - currently on chemo treatment  - previously on Liraglutide but due to shortage could not find  - highest weight 200lb, lowest 168lb and now back to 200lb  - discussed that will follow up on weight at future visits, as patient gets further out from chemo     Moderate episode of recurrent major depressive disorder  Anxiety  - buPROPion XL (WELLBUTRIN XL) 150 MG 24 hr tablet; Take 1 tablet by mouth Every Morning.   - venlafaxine XR (EFFEXOR-XR) 75 MG 24 hr capsule; Take 1 capsule by mouth Daily.    - following with therapist  - continue above medications, will continue until we are 6 months doing well post chemo. She has just finished cancer treatment and getting ready to go back to work.      HTN  - continue Amlodipine 5mg and Losartan 50mg    Screening for colon cancer  Previously on hold given treatment for hodgkins, will re order for colonoscopy now though have asked patient to check with Dr. English to ensure she is good for scope at this time and then we will order    HCM  - will plan to update PNA shot at next appt    FU in 3mo     Arpita Sommers MD   Internal Medicine and Pediatrics, PGY4     Saw Patient Together - Present during the key and critical  portions (GC Modifier)    I have performed an independent face-to-face diagnostic evaluation including performing an independent physical examination. The documented plan of care above was reviewed and developed with Dr. Sommers , resident, who performed portions of the examination and documentation for this patient’s care under my direct supervision.    Liana Alfonso MD, FAAP, FACP  Internal Medicine and Pediatrics  [facility] [place]

## 2025-05-09 ENCOUNTER — RESULTS FOLLOW-UP (OUTPATIENT)
Age: 60
End: 2025-05-09
Payer: COMMERCIAL

## 2025-05-09 LAB
25(OH)D3 SERPL-MCNC: 35.4 NG/ML (ref 30–100)
ALBUMIN SERPL-MCNC: 4.4 G/DL (ref 3.5–5.2)
ALBUMIN/GLOB SERPL: 1.6 G/DL
ALP SERPL-CCNC: 74 U/L (ref 39–117)
ALT SERPL W P-5'-P-CCNC: 48 U/L (ref 1–33)
ANION GAP SERPL CALCULATED.3IONS-SCNC: 14 MMOL/L (ref 5–15)
ANISOCYTOSIS BLD QL: ABNORMAL
AST SERPL-CCNC: 32 U/L (ref 1–32)
BASOPHILS # BLD MANUAL: 0 10*3/MM3 (ref 0–0.2)
BASOPHILS NFR BLD MANUAL: 0 % (ref 0–1.5)
BILIRUB SERPL-MCNC: 0.3 MG/DL (ref 0–1.2)
BUN SERPL-MCNC: 18 MG/DL (ref 6–20)
BUN/CREAT SERPL: 22 (ref 7–25)
BURR CELLS BLD QL SMEAR: ABNORMAL
CALCIUM SPEC-SCNC: 9.3 MG/DL (ref 8.6–10.5)
CHLORIDE SERPL-SCNC: 105 MMOL/L (ref 98–107)
CHOLEST SERPL-MCNC: 232 MG/DL (ref 0–200)
CO2 SERPL-SCNC: 22 MMOL/L (ref 22–29)
CREAT SERPL-MCNC: 0.82 MG/DL (ref 0.57–1)
DACRYOCYTES BLD QL SMEAR: ABNORMAL
EGFRCR SERPLBLD CKD-EPI 2021: 83 ML/MIN/1.73
EOSINOPHIL # BLD MANUAL: 0 10*3/MM3 (ref 0–0.4)
EOSINOPHIL NFR BLD MANUAL: 0 % (ref 0.3–6.2)
GLOBULIN UR ELPH-MCNC: 2.7 GM/DL
GLUCOSE SERPL-MCNC: 90 MG/DL (ref 65–99)
HDLC SERPL-MCNC: 76 MG/DL (ref 40–60)
LDLC SERPL CALC-MCNC: 133 MG/DL (ref 0–100)
LDLC/HDLC SERPL: 1.7 {RATIO}
LYMPHOCYTES # BLD MANUAL: 2.3 10*3/MM3 (ref 0.7–3.1)
LYMPHOCYTES NFR BLD MANUAL: 6.2 % (ref 5–12)
MONOCYTES # BLD: 0.34 10*3/MM3 (ref 0.1–0.9)
NEUTROPHILS # BLD AUTO: 2.8 10*3/MM3 (ref 1.7–7)
NEUTROPHILS NFR BLD MANUAL: 51.5 % (ref 42.7–76)
PLAT MORPH BLD: NORMAL
POIKILOCYTOSIS BLD QL SMEAR: ABNORMAL
POTASSIUM SERPL-SCNC: 4.1 MMOL/L (ref 3.5–5.2)
PROT SERPL-MCNC: 7.1 G/DL (ref 6–8.5)
SODIUM SERPL-SCNC: 141 MMOL/L (ref 136–145)
TRIGL SERPL-MCNC: 134 MG/DL (ref 0–150)
VARIANT LYMPHS NFR BLD MANUAL: 38.1 % (ref 19.6–45.3)
VARIANT LYMPHS NFR BLD MANUAL: 4.1 % (ref 0–5)
VLDLC SERPL-MCNC: 23 MG/DL (ref 5–40)
WBC MORPH BLD: NORMAL

## 2025-05-10 LAB
MEV IGG SER IA-ACNC: >300 AU/ML
MUV IGG SER IA-ACNC: 188 AU/ML
RUBV IGG SERPL IA-ACNC: 3.73 INDEX

## 2025-05-12 ENCOUNTER — TELEPHONE (OUTPATIENT)
Age: 60
End: 2025-05-12
Payer: COMMERCIAL

## 2025-05-12 NOTE — TELEPHONE ENCOUNTER
Called and left vm that Oncology note has cleared her for all vaccines. Advised when she stopped by to  form that she could do PCV20 at that time if she wanted to and we would measure her waist circumference then as well and get her signature for form in order for us to submit form.

## 2025-05-16 ENCOUNTER — CLINICAL SUPPORT (OUTPATIENT)
Age: 60
End: 2025-05-16
Payer: COMMERCIAL

## 2025-05-16 DIAGNOSIS — Z23 NEED FOR VACCINATION: ICD-10-CM

## 2025-05-21 ENCOUNTER — PATIENT MESSAGE (OUTPATIENT)
Age: 60
End: 2025-05-21
Payer: COMMERCIAL

## 2025-05-21 DIAGNOSIS — Z12.11 SCREENING FOR COLON CANCER: Primary | ICD-10-CM

## 2025-06-04 ENCOUNTER — OFFICE VISIT (OUTPATIENT)
Dept: CARDIOLOGY | Facility: CLINIC | Age: 60
End: 2025-06-04
Payer: COMMERCIAL

## 2025-06-04 VITALS
SYSTOLIC BLOOD PRESSURE: 132 MMHG | WEIGHT: 197.4 LBS | HEIGHT: 68 IN | DIASTOLIC BLOOD PRESSURE: 88 MMHG | HEART RATE: 82 BPM | OXYGEN SATURATION: 96 % | BODY MASS INDEX: 29.92 KG/M2

## 2025-06-04 DIAGNOSIS — I71.21 ASCENDING AORTIC ANEURYSM, UNSPECIFIED WHETHER RUPTURED: ICD-10-CM

## 2025-06-04 DIAGNOSIS — I10 ESSENTIAL HYPERTENSION: Primary | ICD-10-CM

## 2025-06-04 DIAGNOSIS — E78.2 MIXED HYPERLIPIDEMIA: ICD-10-CM

## 2025-06-04 PROCEDURE — 99214 OFFICE O/P EST MOD 30 MIN: CPT | Performed by: INTERNAL MEDICINE

## 2025-06-04 PROCEDURE — 93000 ELECTROCARDIOGRAM COMPLETE: CPT | Performed by: INTERNAL MEDICINE

## 2025-06-04 NOTE — PROGRESS NOTES
[facility] Cardiology  [address], [address]  [place], [place], [address]    [phone]  [email or web address]           OUTPATIENT CLINIC NOTE    Patient care team:  Patient Care Team:  Liana Alfonso MD as PCP - General (Internal Medicine)  Derek Gomez MD as Consulting Physician (Cardiology)  Beata Martinez APRN (Nurse Practitioner)    Subjective:   Chief complaint:   Chief Complaint   Patient presents with    Follow-up     1 year follow up         Debbie Morris is a 59 y.o. female.  Cardiac focused problem list:  Dilation of thoracic aorta   Echocardiogram 11/25/2019:  LVEF 64 +/- 5%. Visualized aorta is borderline dilated with maximal dimension of 3.9 cm. No significant valvular abnormalities.   Echocardiogram 11/03/2020:  LVEF 61 +/- 5%. RV systolic function is normal.  Visualized aorta is borderline dilated with maximal dimension of 3.9 cm. No significant valvular abnormalities.   Echocardiogram 5/05/2023:  LVEF 57 +/- 5%. No significant valvular abnormalities. Visualized aorta is borderline dilated with maximal dimension of 3.8 cm at the mid-distal ascending aorta.   Chest CT, 6/2024: Large right hilar and mediastinal mass.  Ascending aorta measures 4 cm in diameter  Hodgkin lymphoma  Noted mediastinal lymphadenopathy on chest CT 2024, subsequent PET scan and lymph node biopsy.    S/p chemo, steroids  Followed by [facility] oncology  Hyperlipidemia   Hypertension   Migraines   Pitts's neuroma   Squamous cell skin cancer   Endometriosis     HPI:    Gained some weight during lymphoma treatments. Feeling better these days since completing chemo 2/2025. Denies chest pain, unusual dyspnea currently. BP has been stable    Non-smoker.  Mother has stents. Maternal GF had MI.    Review of Systems:  As noted above in the HPI    PFSH:  Patient Active Problem List   Diagnosis    Ascending aortic aneurysm    Anxiety    Aortic ectasia, thoracic    Carpal tunnel syndrome     Colon polyps    COVID-19    Elevated serum creatinine    Endometriosis    Elevated LDL cholesterol level    Essential hypertension    Hyperlipidemia    Hypervitaminosis B6    Migraine    Neuroma, Pitts's    Osteopenia    Postmenopausal HRT (hormone replacement therapy)    Reactive depression    Slow transit constipation    Squamous cell skin cancer    Tinnitus of both ears    Urinary incontinence, urge    Vitamin D deficiency    Mediastinal mass    Hilar mass    Mediastinal adenopathy    Nodular sclerosis Hodgkin lymphoma of lymph nodes of multiple regions         Current Outpatient Medications:     amLODIPine (NORVASC) 5 MG tablet, Take 1 tablet by mouth Daily., Disp: 90 tablet, Rfl: 3    buPROPion XL (WELLBUTRIN XL) 150 MG 24 hr tablet, Take 1 tablet by mouth Every Morning., Disp: 90 tablet, Rfl: 1    estradiol (MINIVELLE, VIVELLE-DOT) 0.05 MG/24HR patch, Place 1 patch on the skin as directed by provider 2 (Two) Times a Week., Disp: 24 patch, Rfl: 4    losartan (COZAAR) 50 MG tablet, Take 1 tablet by mouth 2 (Two) Times a Day., Disp: 180 tablet, Rfl: 3    multivitamin with minerals (MULTIVITAMIN WOMENS 50+ ADV PO), Take 1 tablet by mouth Daily., Disp: , Rfl:     polyethylene glycol (MIRALAX) 17 g packet, Take 17 g by mouth As Needed., Disp: , Rfl:     venlafaxine XR (EFFEXOR-XR) 75 MG 24 hr capsule, Take 1 capsule by mouth Daily., Disp: 90 capsule, Rfl: 1    VITAMIN D, CHOLECALCIFEROL, PO, Take  by mouth., Disp: , Rfl:     Allergies   Allergen Reactions    Phentermine Anaphylaxis     Other Reaction(s): Anaphylaxis-PolymyxinB, very hyper , heart racing    Wound Dressing Adhesive Hives     Other Reaction(s): hives    Adhesive Tape Rash     Patient developed contact dermatitis from the surgical glue used for her laparoscopic cholecystectomy    Lisinopril Cough    Tape Rash     Patient developed contact dermatitis from the surgical glue used for her laparoscopic cholecystectomy       Social History     Socioeconomic  "History    Marital status: Single   Tobacco Use    Smoking status: Never     Passive exposure: Never    Smokeless tobacco: Never   Vaping Use    Vaping status: Never Used   Substance and Sexual Activity    Alcohol use: Yes     Alcohol/week: 2.0 standard drinks of alcohol     Types: 2 Glasses of wine per week    Drug use: Never    Sexual activity: Not Currently     Partners: Male     Birth control/protection: Hysterectomy     Family History   Problem Relation Age of Onset    High cholesterol Mother     Heart disease Mother     Colon cancer Father     Hypertension Father     High cholesterol Father     Hypertension Brother     Breast cancer Paternal Grandmother     Heart attack Maternal Grandfather     Colon cancer Paternal Grandfather     Ovarian cancer Neg Hx          Objective:   Physical Exam:  /88 (BP Location: Right arm, Patient Position: Sitting, Cuff Size: Adult)   Pulse 82 Comment: EKG  Ht 172.7 cm (67.99\")   Wt 89.5 kg (197 lb 6.4 oz)   LMP  (LMP Unknown)   SpO2 96%   BMI 30.02 kg/m²   CONSTITUTIONAL: No acute distress  CARDIOVASCULAR: Regular rate and rhythm with normal S1 and S2. Without murmur.  PERIPHERAL VASCULAR: No carotid bruit bilaterally.  Normal radial pulse.     Labs:  Labs reviewed by myself    Lab Results   Component Value Date    CHOL 232 (H) 05/08/2025     Lab Results   Component Value Date    TRIG 134 05/08/2025     Lab Results   Component Value Date    HDL 76 (H) 05/08/2025     Lab Results   Component Value Date     (H) 05/08/2025     No components found for: \"LDLDIRECTC\"    The 10-year ASCVD risk score (Jeanmarie SCOTT, et al., 2019) is: 3.4%    Values used to calculate the score:      Age: 59 years      Sex: Female      Is Non- : No      Diabetic: No      Tobacco smoker: No      Systolic Blood Pressure: 132 mmHg      Is BP treated: Yes      HDL Cholesterol: 76 mg/dL      Total Cholesterol: 232 mg/dL    Diagnostic Data:      ECG 12 Lead    Date/Time: " 6/4/2025 11:08 AM  Performed by: Derek Gomez MD    Authorized by: Derek Gomez MD  Comparison: compared with previous ECG from 5/29/2024  Similar to previous ECG  Comparison to previous ECG: Non specific T wave abnormality  Rhythm: sinus rhythm              Assessment and Plan:     Borderline dilated ascending aorta  Essential hypertension  Mixed hyperlipidemia   - Stable ascending aortic size of 4.0 cm on echocardiogram and CT scans between 2019 and 2025.  Recommend repeat echo in 2029 for this purpose.  If she obtains an echo for another reason in between, we can reset that timeline unless aortic size changes.  -Continue current medications and risk factor modifications.  Encouraged good blood pressure control and LDL reduction  -Monitor blood pressure at home.  If averaging below 110 mmHg systolic consistently, may explain ongoing lightheadedness  -Off statin; working on lifestyle modifications  -Encouraged exercise, heart healthy diet    Lymphoma  History of chemotherapy  - Now in remission.  No additional treatments currently planned.  Will have routine oncology follow-up at least for the next couple years  - Chemo used has some cardiotoxic risk  - Patient to discuss whether or not she needs another echo per their protocol.  If they are uncertain, I will discuss this with my partner Dr. Villa who has more expertise in cardio-oncology    - Return in about 1 year (around 6/4/2026) for Follow up; either NIMISHA Scruggs or FLOR Wilks.

## 2025-07-15 ENCOUNTER — TELEPHONE (OUTPATIENT)
Dept: GASTROENTEROLOGY | Facility: CLINIC | Age: 60
End: 2025-07-15

## 2025-08-08 ENCOUNTER — OFFICE VISIT (OUTPATIENT)
Age: 60
End: 2025-08-08
Payer: COMMERCIAL

## 2025-08-08 VITALS
SYSTOLIC BLOOD PRESSURE: 130 MMHG | DIASTOLIC BLOOD PRESSURE: 88 MMHG | BODY MASS INDEX: 30.16 KG/M2 | OXYGEN SATURATION: 99 % | HEIGHT: 68 IN | HEART RATE: 72 BPM | WEIGHT: 199 LBS

## 2025-08-08 DIAGNOSIS — C81.18 NODULAR SCLEROSIS HODGKIN LYMPHOMA OF LYMPH NODES OF MULTIPLE REGIONS: ICD-10-CM

## 2025-08-08 DIAGNOSIS — Z79.890 POSTMENOPAUSAL HRT (HORMONE REPLACEMENT THERAPY): ICD-10-CM

## 2025-08-08 DIAGNOSIS — I71.21 ASCENDING AORTIC ANEURYSM, UNSPECIFIED WHETHER RUPTURED: Primary | ICD-10-CM

## 2025-08-08 DIAGNOSIS — G43.909 MIGRAINE WITHOUT STATUS MIGRAINOSUS, NOT INTRACTABLE, UNSPECIFIED MIGRAINE TYPE: ICD-10-CM

## 2025-08-08 DIAGNOSIS — C44.92 SQUAMOUS CELL SKIN CANCER: ICD-10-CM

## 2025-08-08 DIAGNOSIS — F41.9 ANXIETY: ICD-10-CM

## 2025-08-08 DIAGNOSIS — I10 ESSENTIAL HYPERTENSION: ICD-10-CM

## 2025-08-08 DIAGNOSIS — E78.2 MIXED HYPERLIPIDEMIA: ICD-10-CM

## 2025-08-08 DIAGNOSIS — E55.9 VITAMIN D DEFICIENCY: ICD-10-CM

## 2025-08-08 DIAGNOSIS — F32.9 REACTIVE DEPRESSION: ICD-10-CM

## 2025-08-08 PROCEDURE — 99214 OFFICE O/P EST MOD 30 MIN: CPT | Performed by: INTERNAL MEDICINE

## 2025-08-12 RX ORDER — SODIUM, POTASSIUM,MAG SULFATES 17.5-3.13G
1 SOLUTION, RECONSTITUTED, ORAL ORAL TAKE AS DIRECTED
Qty: 354 ML | Refills: 0 | Status: SHIPPED | OUTPATIENT
Start: 2025-08-12

## 2025-08-25 ENCOUNTER — OUTSIDE FACILITY SERVICE (OUTPATIENT)
Dept: GASTROENTEROLOGY | Facility: CLINIC | Age: 60
End: 2025-08-25
Payer: COMMERCIAL

## 2025-08-25 PROCEDURE — 45378 DIAGNOSTIC COLONOSCOPY: CPT | Performed by: INTERNAL MEDICINE

## (undated) DEVICE — TRAP FLD MINIVAC MEGADYNE 100ML

## (undated) DEVICE — TRAP,MUCUS SPECIMEN,40CC: Brand: MEDLINE

## (undated) DEVICE — APPL CHLORAPREP TINTED 26ML TEAL

## (undated) DEVICE — ANTIBACTERIAL UNDYED BRAIDED (POLYGLACTIN 910), SYNTHETIC ABSORBABLE SUTURE: Brand: COATED VICRYL

## (undated) DEVICE — SUT SILK 3/0 TIES 18IN A184H

## (undated) DEVICE — INTENDED FOR TISSUE SEPARATION, AND OTHER PROCEDURES THAT REQUIRE A SHARP SURGICAL BLADE TO PUNCTURE OR CUT.: Brand: BARD-PARKER ® STAINLESS STEEL BLADES

## (undated) DEVICE — ST EXT MICROBORE FIX M LL 38IN

## (undated) DEVICE — DRAPE,UTILITY,TAPE,15X26,STERILE: Brand: MEDLINE

## (undated) DEVICE — PATIENT RETURN ELECTRODE, SINGLE-USE, CONTACT QUALITY MONITORING, ADULT, WITH 9FT CORD, FOR PATIENTS WEIGING OVER 33LBS. (15KG): Brand: MEGADYNE

## (undated) DEVICE — PENCL SMOKE/EVAC MEGADYNE TELESCP 10FT

## (undated) DEVICE — NDL ASP VIZISHOT FLX 19GA

## (undated) DEVICE — Device: Brand: SINGLE USE ASPIRATION NEEDLE NA-U401SX

## (undated) DEVICE — SINGLE USE SUCTION VALVE MAJ-209: Brand: SINGLE USE SUCTION VALVE (STERILE)

## (undated) DEVICE — Device: Brand: BALLOON

## (undated) DEVICE — SPNG GZ WOVN 4X4IN 12PLY 10/BX STRL

## (undated) DEVICE — PK MINOR SPLT 10

## (undated) DEVICE — GLV SURG BIOGEL LTX PF 7

## (undated) DEVICE — UNDRGLV SURG BIOGEL PUNCTUREINDICATION SZ7 PF STRL

## (undated) DEVICE — LUER-LOK 360°: Brand: CONNECTA, LUER-LOK

## (undated) DEVICE — ADAPT SWVL FIBROPTIC BRONCH

## (undated) DEVICE — ELECTRD BLD EZ CLN MOD XLNG 2.75IN

## (undated) DEVICE — STOCKINETTE,DOUBLE PLY,4X48,STERILE: Brand: MEDLINE

## (undated) DEVICE — SUT SILK 2/0 TIES 18IN A185H

## (undated) DEVICE — ADAPT BRONCH ILLUMISITE FOR OLYMP/190

## (undated) DEVICE — PTCH SENSR ILLUMISITE ADHS

## (undated) DEVICE — SUT MNCRYL PLS ANTIB UD 4/0 PS2 18IN